# Patient Record
Sex: FEMALE | Race: WHITE | NOT HISPANIC OR LATINO | Employment: FULL TIME | URBAN - NONMETROPOLITAN AREA
[De-identification: names, ages, dates, MRNs, and addresses within clinical notes are randomized per-mention and may not be internally consistent; named-entity substitution may affect disease eponyms.]

---

## 2017-01-20 ENCOUNTER — OFFICE VISIT (OUTPATIENT)
Dept: FAMILY MEDICINE CLINIC | Facility: CLINIC | Age: 42
End: 2017-01-20

## 2017-01-20 VITALS
DIASTOLIC BLOOD PRESSURE: 76 MMHG | WEIGHT: 206 LBS | BODY MASS INDEX: 33.11 KG/M2 | HEIGHT: 66 IN | HEART RATE: 86 BPM | TEMPERATURE: 98.9 F | SYSTOLIC BLOOD PRESSURE: 128 MMHG | OXYGEN SATURATION: 99 %

## 2017-01-20 DIAGNOSIS — K21.9 GASTROESOPHAGEAL REFLUX DISEASE WITHOUT ESOPHAGITIS: ICD-10-CM

## 2017-01-20 DIAGNOSIS — F51.01 PRIMARY INSOMNIA: ICD-10-CM

## 2017-01-20 PROCEDURE — 99213 OFFICE O/P EST LOW 20 MIN: CPT | Performed by: FAMILY MEDICINE

## 2017-01-20 RX ORDER — TRAZODONE HYDROCHLORIDE 50 MG/1
75 TABLET ORAL NIGHTLY
Qty: 135 TABLET | Refills: 3 | Status: SHIPPED | OUTPATIENT
Start: 2017-01-20 | End: 2017-05-05

## 2017-01-20 RX ORDER — TRAZODONE HYDROCHLORIDE 50 MG/1
75 TABLET ORAL NIGHTLY
Qty: 45 TABLET | Refills: 3 | Status: SHIPPED | OUTPATIENT
Start: 2017-01-20 | End: 2017-01-20 | Stop reason: SDUPTHER

## 2017-01-20 NOTE — PROGRESS NOTES
Subjective   Jesica Green is a 42 y.o. female.     Chief Complaint   Patient presents with   • Follow-up     scheduled follow up; Trazodone has not been helping with insomnia       History of Present Illness   Patient is here for scheduled follow-up visit.  The following portions of the patient's history were reviewed and updated as appropriate: allergies, current medications, past family history, past medical history, past social history, past surgical history and problem list.    Review of Systems   Constitutional: Negative for activity change, appetite change, chills, diaphoresis, fatigue, fever and unexpected weight change.   HENT: Negative for congestion, dental problem, drooling, ear discharge, ear pain, facial swelling, hearing loss, mouth sores, nosebleeds, postnasal drip, rhinorrhea, sinus pressure, sneezing, sore throat, tinnitus, trouble swallowing and voice change.    Eyes: Negative for photophobia, pain, discharge, redness, itching and visual disturbance.   Respiratory: Negative for apnea, cough, choking, chest tightness, shortness of breath, wheezing and stridor.    Cardiovascular: Negative for chest pain, palpitations and leg swelling.   Gastrointestinal: Negative for abdominal distention, abdominal pain, anal bleeding, blood in stool, constipation, diarrhea, nausea, rectal pain and vomiting.   Endocrine: Negative for cold intolerance, heat intolerance, polydipsia, polyphagia and polyuria.   Genitourinary: Positive for menstrual problem. Negative for decreased urine volume, difficulty urinating, dysuria, enuresis, flank pain, frequency, genital sores, hematuria and urgency.   Musculoskeletal: Positive for arthralgias, back pain and myalgias. Negative for gait problem, joint swelling, neck pain and neck stiffness.   Skin: Negative for color change, pallor, rash and wound.        Hair loss   Allergic/Immunologic: Negative for food allergies and immunocompromised state.   Neurological: Positive for  headaches. Negative for dizziness, tremors, seizures, syncope, facial asymmetry, speech difficulty, weakness, light-headedness and numbness.   Hematological: Negative for adenopathy. Does not bruise/bleed easily.   Psychiatric/Behavioral: Positive for sleep disturbance. Negative for agitation, behavioral problems, confusion, decreased concentration, dysphoric mood, hallucinations, self-injury and suicidal ideas. The patient is not nervous/anxious and is not hyperactive.         Freq alternating mood, depression and anxiety       Patient Active Problem List   Diagnosis   • Malaise and fatigue   • Polyarthralgia   • Constipation   • Gastroesophageal reflux disease without esophagitis   • Family history of diabetes mellitus   • Family history of thyroid disease in mother   • Arthralgia of hands, bilateral   • Chronic lumbar pain   • Morning joint stiffness of multiple sites   • Telogen effluvium   • Alopecia   • FHx: Charcot-Edita-Tooth disease   • Iron deficiency   • Vitamin D deficiency   • Need for vaccination       Current Outpatient Prescriptions on File Prior to Visit   Medication Sig Dispense Refill   • diclofenac (CATAFLAM) 50 MG tablet Take 1 tablet by mouth 2 (Two) Times a Day With Meals. 60 tablet 5   • ferrous sulfate 325 (65 FE) MG tablet Take 1 tablet by mouth Daily With Breakfast. 60 tablet 5   • omeprazole (PriLOSEC) 40 MG capsule Take 1 capsule by mouth Daily. 30 capsule 5     No current facility-administered medications on file prior to visit.        Social History     Social History   • Marital status:      Spouse name: N/A   • Number of children: N/A   • Years of education: N/A     Occupational History   • Not on file.     Social History Main Topics   • Smoking status: Never Smoker   • Smokeless tobacco: Not on file   • Alcohol use Yes   • Drug use: No   • Sexual activity: Not on file     Other Topics Concern   • Not on file     Social History Narrative       Objective   Blood pressure  "128/76, pulse 86, temperature 98.9 °F (37.2 °C), height 66\" (167.6 cm), weight 206 lb (93.4 kg), SpO2 99 %.     Physical Exam   Constitutional: She is oriented to person, place, and time. She appears well-developed and well-nourished. No distress.   HENT:   Head: Normocephalic and atraumatic.   Right Ear: External ear normal.   Left Ear: External ear normal.   Nose: Nose normal.   Mouth/Throat: Oropharynx is clear and moist. No oropharyngeal exudate.   Eyes: Conjunctivae and EOM are normal. Pupils are equal, round, and reactive to light. Right eye exhibits no discharge. Left eye exhibits no discharge. No scleral icterus.   Neck: Normal range of motion. Neck supple. No JVD present. No tracheal deviation present. No thyromegaly present.   Cardiovascular: Normal rate, normal heart sounds and intact distal pulses.  Exam reveals no gallop and no friction rub.    No murmur heard.  Pulmonary/Chest: Effort normal and breath sounds normal. No stridor. No respiratory distress. She has no wheezes. She has no rales. She exhibits no tenderness.   Abdominal: Soft. Bowel sounds are normal. She exhibits no distension and no mass. There is no tenderness. There is no rebound and no guarding. No hernia.   Genitourinary:   Genitourinary Comments: Pt defers   Musculoskeletal: Normal range of motion. She exhibits no edema, tenderness or deformity.   Lymphadenopathy:     She has no cervical adenopathy.   Neurological: She is alert and oriented to person, place, and time. She has normal reflexes. She displays normal reflexes. No cranial nerve deficit. She exhibits normal muscle tone. Coordination normal.   Skin: Skin is warm. No rash noted. She is not diaphoretic. No erythema. No pallor.   Psychiatric: She has a normal mood and affect. Her behavior is normal. Judgment and thought content normal.   Nursing note and vitals reviewed.      Results for orders placed or performed in visit on 12/09/16   Vitamin D 25 Hydroxy   Result Value Ref " Range    25 Hydroxy, Vitamin D 43.2 ng/ml       Assessment/Plan   Problems Addressed this Visit        Digestive    Gastroesophageal reflux disease without esophagitis      Other Visit Diagnoses     BMI 33.0-33.9,adult    -  Primary    Primary insomnia        Relevant Medications    traZODone (DESYREL) 50 MG tablet               Discussion/Summary:  Discussed plan of care in detail with pt today; pt verb understanding and agrees; counseled for approx 10 min of total 15 min exam time.    Anti - reflux measures, trigger foods and drinks to avoid: Fatty foods, alcohol, chocolate, coffee, tea, caffeinated soft drinks (decaffeinated coffee still has some caffeine), peppermint and spearmint, spices and vinegar, citrus fruits and juices, tomatoes and tomato sauces, and smoking. Other antireflux measures include weight reduction if overweight, avoid tight clothing around the abdomen, elevate the head of her bed 6 inches (May use a bed wedge which is placed between the mattress in box [place]) or blocks under the head of the bed, don't drink or eat for 2 hours before going to bed and avoid lying down immediately after meals.      There are no Patient Instructions on file for this visit.

## 2017-01-20 NOTE — MR AVS SNAPSHOT
Jesica Green   1/20/2017 10:00 AM   Office Visit    Dept Phone:  [phone]   Encounter #:  [id number]    Provider:  Sarthak Patel DO   Department:  [facility] PRIMARY CARE                Your Full Care Plan              Today's Medication Changes          These changes are accurate as of: 1/20/17 10:43 AM.  If you have any questions, ask your nurse or doctor.               Medication(s)that have changed:     traZODone 50 MG tablet   Commonly known as:  DESYREL   Take 1.5 tablets by mouth Every Night.   What changed:  how much to take   Changed by:  Sarthak Patel DO            Where to Get Your Medications      These medications were sent to [facility] - [place], [place] - [address] [place] [address] - [phone]  - [phone] [address] [address]     Phone:  [phone]     traZODone 50 MG tablet                  Your Updated Medication List          This list is accurate as of: 1/20/17 10:43 AM.  Always use your most recent med list.                diclofenac 50 MG tablet   Commonly known as:  CATAFLAM   Take 1 tablet by mouth 2 (Two) Times a Day With Meals.       ferrous sulfate 325 (65 FE) MG tablet   Take 1 tablet by mouth Daily With Breakfast.       omeprazole 40 MG capsule   Commonly known as:  priLOSEC   Take 1 capsule by mouth Daily.       traZODone 50 MG tablet   Commonly known as:  DESYREL   Take 1.5 tablets by mouth Every Night.               You Were Diagnosed With        Codes Comments    BMI 33.0-33.9,adult    -  Primary ICD-10-CM: Z68.33  ICD-9-CM: V85.33     Primary insomnia     ICD-10-CM: F51.01  ICD-9-CM: 307.42     Gastroesophageal reflux disease without esophagitis     ICD-10-CM: K21.9  ICD-9-CM: 530.81       Instructions     None    Patient Instructions History      Upcoming Appointments     Visit Type Date Time Department    OFFICE VISIT 1/20/2017 10:00 AM [facility]    OFFICE VISIT  "2017  9:45 AM [facility]      [employer] Signup     [facility] [employer] allows you to send messages to your doctor, view your test results, renew your prescriptions, schedule appointments, and more. To sign up, go to [employer] and click on the Sign Up Now link in the New User? box. Enter your [employer] Activation Code exactly as it appears below along with the last four digits of your Social Security Number and your Date of Birth () to complete the sign-up process. If you do not sign up before the expiration date, you must request a new code.    [employer] Activation Code: [email or web address]  Expires: 2/3/2017 10:43 AM    If you have questions, you can email [email or web address] or call [phone] to talk to our [employer] staff. Remember, [employer] is NOT to be used for urgent needs. For medical emergencies, dial 911.               Other Info from Your Visit           Your Appointments     2017  9:45 AM EDT   Office Visit with Sarthak Patel DO   [facility]IMARY [facility] (--)    [address] [place] [id number] [phone]           Arrive 15 minutes prior to appointment.              Allergies     No Known Allergies      Reason for Visit     Follow-up scheduled follow up; Trazodone has not been helping with insomnia      Vital Signs     Blood Pressure Pulse Temperature Height Weight Oxygen Saturation    128/76 (BP Location: Right arm, Patient Position: Sitting) 86 98.9 °F (37.2 °C) 66\" (167.6 cm) 206 lb (93.4 kg) 99%    Body Mass Index Smoking Status                33.25 kg/m2 Never Smoker          Problems and Diagnoses Noted     Acid reflux disease    Body mass index 33.0-33.9, adult    -  Primary    Difficulty falling or staying asleep            "

## 2017-05-05 ENCOUNTER — OFFICE VISIT (OUTPATIENT)
Dept: FAMILY MEDICINE CLINIC | Facility: CLINIC | Age: 42
End: 2017-05-05

## 2017-05-05 VITALS
DIASTOLIC BLOOD PRESSURE: 87 MMHG | HEIGHT: 66 IN | RESPIRATION RATE: 16 BRPM | TEMPERATURE: 98.5 F | SYSTOLIC BLOOD PRESSURE: 143 MMHG | OXYGEN SATURATION: 100 % | WEIGHT: 211 LBS | HEART RATE: 63 BPM | BODY MASS INDEX: 33.91 KG/M2

## 2017-05-05 DIAGNOSIS — M25.50 POLYARTHRALGIA: Primary | ICD-10-CM

## 2017-05-05 DIAGNOSIS — R51.9 NOCTURNAL HEADACHES: ICD-10-CM

## 2017-05-05 DIAGNOSIS — G47.09 OTHER INSOMNIA: ICD-10-CM

## 2017-05-05 DIAGNOSIS — R51.9 HEAD AND FACE PAIN: ICD-10-CM

## 2017-05-05 DIAGNOSIS — E61.1 IRON DEFICIENCY: ICD-10-CM

## 2017-05-05 DIAGNOSIS — R53.82 CHRONIC FATIGUE: ICD-10-CM

## 2017-05-05 PROCEDURE — 99214 OFFICE O/P EST MOD 30 MIN: CPT | Performed by: INTERNAL MEDICINE

## 2017-05-06 ENCOUNTER — LAB (OUTPATIENT)
Dept: LAB | Facility: HOSPITAL | Age: 42
End: 2017-05-06
Attending: INTERNAL MEDICINE

## 2017-05-06 DIAGNOSIS — M25.50 POLYARTHRALGIA: ICD-10-CM

## 2017-05-06 DIAGNOSIS — R51.9 NOCTURNAL HEADACHES: ICD-10-CM

## 2017-05-06 LAB
ALBUMIN SERPL-MCNC: 4.3 G/DL (ref 3.5–5)
ALBUMIN/GLOB SERPL: 1.4 G/DL (ref 1–2)
ALP SERPL-CCNC: 54 U/L (ref 38–126)
ALT SERPL W P-5'-P-CCNC: 29 U/L (ref 13–69)
ANION GAP SERPL CALCULATED.3IONS-SCNC: 14.4 MMOL/L
AST SERPL-CCNC: 21 U/L (ref 15–46)
BASOPHILS # BLD AUTO: 0.05 10*3/MM3 (ref 0–0.2)
BASOPHILS NFR BLD AUTO: 0.9 % (ref 0–2.5)
BILIRUB SERPL-MCNC: 0.5 MG/DL (ref 0.2–1.3)
BUN BLD-MCNC: 22 MG/DL (ref 7–20)
BUN/CREAT SERPL: 27.5 (ref 7.1–23.5)
CALCIUM SPEC-SCNC: 9.3 MG/DL (ref 8.4–10.2)
CHLORIDE SERPL-SCNC: 106 MMOL/L (ref 98–107)
CO2 SERPL-SCNC: 25 MMOL/L (ref 26–30)
CREAT BLD-MCNC: 0.8 MG/DL (ref 0.6–1.3)
DEPRECATED RDW RBC AUTO: 40 FL (ref 37–54)
EOSINOPHIL # BLD AUTO: 0.07 10*3/MM3 (ref 0–0.7)
EOSINOPHIL NFR BLD AUTO: 1.3 % (ref 0–7)
ERYTHROCYTE [DISTWIDTH] IN BLOOD BY AUTOMATED COUNT: 12 % (ref 11.5–14.5)
GFR SERPL CREATININE-BSD FRML MDRD: 79 ML/MIN/1.73
GLOBULIN UR ELPH-MCNC: 3.1 GM/DL
GLUCOSE BLD-MCNC: 88 MG/DL (ref 74–98)
HCT VFR BLD AUTO: 39.3 % (ref 37–47)
HGB BLD-MCNC: 13.3 G/DL (ref 12–16)
IMM GRANULOCYTES # BLD: 0.02 10*3/MM3 (ref 0–0.06)
IMM GRANULOCYTES NFR BLD: 0.4 % (ref 0–0.6)
IRON 24H UR-MRATE: 83 MCG/DL (ref 37–181)
IRON SATN MFR SERPL: 29 % (ref 11–46)
LYMPHOCYTES # BLD AUTO: 1.49 10*3/MM3 (ref 0.6–3.4)
LYMPHOCYTES NFR BLD AUTO: 28.2 % (ref 10–50)
MCH RBC QN AUTO: 30.9 PG (ref 27–31)
MCHC RBC AUTO-ENTMCNC: 33.8 G/DL (ref 30–37)
MCV RBC AUTO: 91.4 FL (ref 81–99)
MONOCYTES # BLD AUTO: 0.31 10*3/MM3 (ref 0–0.9)
MONOCYTES NFR BLD AUTO: 5.9 % (ref 0–12)
NEUTROPHILS # BLD AUTO: 3.35 10*3/MM3 (ref 2–6.9)
NEUTROPHILS NFR BLD AUTO: 63.3 % (ref 37–80)
NRBC BLD MANUAL-RTO: 0 /100 WBC (ref 0–0)
PLATELET # BLD AUTO: 210 10*3/MM3 (ref 130–400)
PMV BLD AUTO: 11.1 FL (ref 6–12)
POTASSIUM BLD-SCNC: 4.4 MMOL/L (ref 3.5–5.1)
PROT SERPL-MCNC: 7.4 G/DL (ref 6.3–8.2)
RBC # BLD AUTO: 4.3 10*6/MM3 (ref 4.2–5.4)
SODIUM BLD-SCNC: 141 MMOL/L (ref 137–145)
T4 FREE SERPL-MCNC: 0.94 NG/DL (ref 0.78–2.19)
TIBC SERPL-MCNC: 282 MCG/DL (ref 261–497)
TSH SERPL DL<=0.05 MIU/L-ACNC: 1.56 MIU/ML (ref 0.47–4.68)
WBC NRBC COR # BLD: 5.29 10*3/MM3 (ref 4.8–10.8)

## 2017-05-06 PROCEDURE — 84305 ASSAY OF SOMATOMEDIN: CPT | Performed by: INTERNAL MEDICINE

## 2017-05-06 PROCEDURE — 85025 COMPLETE CBC W/AUTO DIFF WBC: CPT | Performed by: INTERNAL MEDICINE

## 2017-05-06 PROCEDURE — 84481 FREE ASSAY (FT-3): CPT | Performed by: INTERNAL MEDICINE

## 2017-05-06 PROCEDURE — 84439 ASSAY OF FREE THYROXINE: CPT | Performed by: INTERNAL MEDICINE

## 2017-05-06 PROCEDURE — 83550 IRON BINDING TEST: CPT | Performed by: INTERNAL MEDICINE

## 2017-05-06 PROCEDURE — 84443 ASSAY THYROID STIM HORMONE: CPT | Performed by: INTERNAL MEDICINE

## 2017-05-06 PROCEDURE — 83540 ASSAY OF IRON: CPT | Performed by: INTERNAL MEDICINE

## 2017-05-06 PROCEDURE — 80053 COMPREHEN METABOLIC PANEL: CPT | Performed by: INTERNAL MEDICINE

## 2017-05-06 PROCEDURE — 36415 COLL VENOUS BLD VENIPUNCTURE: CPT

## 2017-05-07 LAB — T3FREE SERPL-MCNC: 2.8 PG/ML (ref 2–4.4)

## 2017-05-08 LAB — IGF-I SERPL-MCNC: 100 NG/ML (ref 69–227)

## 2017-05-12 ENCOUNTER — OFFICE VISIT (OUTPATIENT)
Dept: OBSTETRICS AND GYNECOLOGY | Facility: CLINIC | Age: 42
End: 2017-05-12

## 2017-05-12 VITALS
BODY MASS INDEX: 33.27 KG/M2 | WEIGHT: 207 LBS | DIASTOLIC BLOOD PRESSURE: 86 MMHG | HEIGHT: 66 IN | SYSTOLIC BLOOD PRESSURE: 124 MMHG

## 2017-05-12 DIAGNOSIS — N92.6 IRREGULAR MENSES: ICD-10-CM

## 2017-05-12 DIAGNOSIS — Z12.4 SCREENING FOR MALIGNANT NEOPLASM OF CERVIX: ICD-10-CM

## 2017-05-12 DIAGNOSIS — Z01.419 ENCOUNTER FOR GYNECOLOGICAL EXAMINATION WITHOUT ABNORMAL FINDING: Primary | ICD-10-CM

## 2017-05-12 PROCEDURE — 99386 PREV VISIT NEW AGE 40-64: CPT | Performed by: PHYSICIAN ASSISTANT

## 2017-05-19 ENCOUNTER — APPOINTMENT (OUTPATIENT)
Dept: MRI IMAGING | Facility: HOSPITAL | Age: 42
End: 2017-05-19
Attending: INTERNAL MEDICINE

## 2017-06-02 ENCOUNTER — HOSPITAL ENCOUNTER (OUTPATIENT)
Dept: MAMMOGRAPHY | Facility: HOSPITAL | Age: 42
Discharge: HOME | End: 2017-06-02
Admitting: PHYSICIAN ASSISTANT

## 2017-06-02 ENCOUNTER — HOSPITAL ENCOUNTER (OUTPATIENT)
Dept: MRI IMAGING | Facility: HOSPITAL | Age: 42
Discharge: HOME | End: 2017-06-02
Attending: INTERNAL MEDICINE

## 2017-06-02 DIAGNOSIS — R51.9 HEAD AND FACE PAIN: ICD-10-CM

## 2017-06-02 DIAGNOSIS — R51.9 NOCTURNAL HEADACHES: ICD-10-CM

## 2017-06-02 DIAGNOSIS — Z01.419 ENCOUNTER FOR GYNECOLOGICAL EXAMINATION WITHOUT ABNORMAL FINDING: ICD-10-CM

## 2017-06-02 PROCEDURE — A9577 INJ MULTIHANCE: HCPCS | Performed by: INTERNAL MEDICINE

## 2017-06-02 PROCEDURE — 70553 MRI BRAIN STEM W/O & W/DYE: CPT

## 2017-06-02 PROCEDURE — G0202 SCR MAMMO BI INCL CAD: HCPCS

## 2017-06-02 PROCEDURE — 77063 BREAST TOMOSYNTHESIS BI: CPT

## 2017-06-02 PROCEDURE — 0 GADOBENATE DIMEGLUMINE 529 MG/ML SOLUTION: Performed by: INTERNAL MEDICINE

## 2017-06-02 RX ADMIN — GADOBENATE DIMEGLUMINE 15 ML: 529 INJECTION, SOLUTION INTRAVENOUS at 08:45

## 2017-06-02 RX ADMIN — GADOBENATE DIMEGLUMINE 3 ML: 529 INJECTION, SOLUTION INTRAVENOUS at 08:45

## 2017-06-20 ENCOUNTER — OFFICE VISIT (OUTPATIENT)
Dept: OBSTETRICS AND GYNECOLOGY | Facility: CLINIC | Age: 42
End: 2017-06-20

## 2017-06-20 VITALS
WEIGHT: 206 LBS | DIASTOLIC BLOOD PRESSURE: 80 MMHG | HEIGHT: 66 IN | BODY MASS INDEX: 33.11 KG/M2 | SYSTOLIC BLOOD PRESSURE: 132 MMHG

## 2017-06-20 DIAGNOSIS — N92.6 IRREGULAR MENSES: Primary | ICD-10-CM

## 2017-06-20 DIAGNOSIS — N94.6 DYSMENORRHEA: ICD-10-CM

## 2017-06-20 PROCEDURE — 99213 OFFICE O/P EST LOW 20 MIN: CPT | Performed by: PHYSICIAN ASSISTANT

## 2017-06-20 NOTE — PATIENT INSTRUCTIONS
Will rto for fasting labs to check hormone levels-will call with results  Pending review of labs will discuss options for cycle management also

## 2017-06-20 NOTE — PROGRESS NOTES
"Subjective   Chief Complaint   Patient presents with   • Follow-up     tvs done today, pelvic pain     /80  Ht 66\" (167.6 cm)  Wt 206 lb (93.4 kg)  BMI 33.25 kg/m2   Jesica Green is a 42 y.o. year old  presenting to be seen for follow up irregular menses and dysmenorrhea  Her previous visit is reviewed  Also records from [place] reviewed which show patient had hysterogram and not ablation done    TVS done today shows normal uterus with endometrium 16 mm (LMP 17) -ovaries with multiple small follicles bilaterally-no free fluid  Patient has complaint today of difficulty losing weight- acne flare ups and some hair loss  She has had recent labs with pcp and had normal thyroid and CMP   She has been told in past might have PCOS but labs have been normal-testosterone and fasting insulin not checked  Past Medical History:   Diagnosis Date   • Acid reflux    • Anemia    • History of Papanicolaou smear of cervix    • Positive PPD         Current Outpatient Prescriptions:   •  ferrous sulfate 325 (65 FE) MG tablet, Take 1 tablet by mouth once daily with breakfast, Disp: 60 tablet, Rfl: 5  •  Glucosamine-Chondroitin (JOINT PAIN FORMULA PO), Take  by mouth., Disp: , Rfl:   •  omeprazole (PriLOSEC) 40 MG capsule, Take 1 capsule by mouth Daily., Disp: 30 capsule, Rfl: 5  •  diclofenac (VOLTAREN) 50 MG EC tablet, , Disp: , Rfl:    No Known Allergies   Past Surgical History:   Procedure Laterality Date   • BILATERAL BREAST REDUCTION     • FOOT SURGERY     • TONSILLECTOMY        Social History     Social History   • Marital status:      Spouse name: N/A   • Number of children: N/A   • Years of education: N/A     Occupational History   • Not on file.     Social History Main Topics   • Smoking status: Never Smoker   • Smokeless tobacco: Never Used   • Alcohol use Yes   • Drug use: No   • Sexual activity: Defer     Other Topics Concern   • Not on file     Social History Narrative      Family " History   Problem Relation Age of Onset   • Thyroid disease Mother    • Breast cancer Maternal Aunt    • Cervical cancer Maternal Aunt        The following portions of the patient's history were reviewed and updated as appropriate:problem list, current medications, allergies, past family history, past medical history, past social history and past surgical history.    Review of Systems   Constitutional:        Weight gain   Genitourinary: Positive for menstrual problem and vaginal bleeding.        Dysmenorrhea        Objective     Physical Exam  Jesica was seen today for follow-up.    Diagnoses and all orders for this visit:    Irregular menses  -     Insulin, Total; Future  -     Testosterone (Free & Total), LC / MS; Future  -     Estradiol; Future  -     Follicle Stimulating Hormone; Future  -     Progesterone; Future  -     Luteinizing Hormone; Future    Dysmenorrhea             This note was electronically signed.    Kristina Ford PA-C   June 20, 2017

## 2017-06-25 ENCOUNTER — RESULTS ENCOUNTER (OUTPATIENT)
Dept: OBSTETRICS AND GYNECOLOGY | Facility: CLINIC | Age: 42
End: 2017-06-25

## 2017-06-25 DIAGNOSIS — N92.6 IRREGULAR MENSES: ICD-10-CM

## 2017-06-27 LAB
ESTRADIOL SERPL-MCNC: 64.5 PG/ML
FSH SERPL-ACNC: 6.5 MIU/ML
INSULIN SERPL-ACNC: 6.7 UIU/ML (ref 2.6–24.9)
LH SERPL-ACNC: 5.4 MIU/ML
PROGEST SERPL-MCNC: 2.8 NG/ML
TESTOST FREE SERPL-MCNC: 1.8 PG/ML (ref 0–4.2)
TESTOST SERPL-MCNC: 29.3 NG/DL

## 2017-07-14 ENCOUNTER — OFFICE VISIT (OUTPATIENT)
Dept: FAMILY MEDICINE CLINIC | Facility: CLINIC | Age: 42
End: 2017-07-14

## 2017-07-14 VITALS
HEIGHT: 66 IN | DIASTOLIC BLOOD PRESSURE: 89 MMHG | RESPIRATION RATE: 16 BRPM | SYSTOLIC BLOOD PRESSURE: 119 MMHG | WEIGHT: 210 LBS | HEART RATE: 68 BPM | OXYGEN SATURATION: 100 % | TEMPERATURE: 98.6 F | BODY MASS INDEX: 33.75 KG/M2

## 2017-07-14 DIAGNOSIS — R53.82 CHRONIC FATIGUE: ICD-10-CM

## 2017-07-14 DIAGNOSIS — R53.81 MALAISE AND FATIGUE: ICD-10-CM

## 2017-07-14 DIAGNOSIS — R53.83 MALAISE AND FATIGUE: ICD-10-CM

## 2017-07-14 DIAGNOSIS — G47.33 OSA (OBSTRUCTIVE SLEEP APNEA): ICD-10-CM

## 2017-07-14 DIAGNOSIS — M25.561 ACUTE PAIN OF RIGHT KNEE: Primary | ICD-10-CM

## 2017-07-14 PROCEDURE — 99214 OFFICE O/P EST MOD 30 MIN: CPT | Performed by: INTERNAL MEDICINE

## 2017-07-14 NOTE — PROGRESS NOTES
"Subjective   Patient ID: Jesica Green is a 42 y.o. female Pt is here for management of multiple medical problems.    Chief Complaint   Patient presents with   • Fatigue     2 month follow-up   • Insomnia     2 month follow-up   • medication refills     patient will need refills on Ferrous Sulfate if she is to continue taking       History of Present Illness    Sig knee pain with exersice.     No sig wt loss.   Asking to wt loss options.   At 1400 [address].   stopped  The following portions of the patient's history were reviewed and updated as appropriate: allergies, current medications, past family history, past medical history, past social history, past surgical history and problem list.      Review of Systems   Constitutional: Positive for fatigue.   Musculoskeletal: Positive for arthralgias. Negative for back pain.   All other systems reviewed and are negative.      Objective     /89 (BP Location: Left arm, Patient Position: Sitting, Cuff Size: Adult)  Pulse 68  Temp 98.6 °F (37 °C) (Oral)   Resp 16  Ht 66\" (167.6 cm)  Wt 210 lb (95.3 kg)  LMP 06/17/2017  SpO2 100%  BMI 33.89 kg/m2  Physical Exam  General Appearance:    Alert, cooperative, no distress, appears stated age   Head:    Normocephalic, without obvious abnormality, atraumatic   Eyes:    PERRL, conjunctiva/corneas clear, EOM's intact           Ears:    Normal TM's and external ear canals, both ears   Nose:   Nares normal, septum midline, mucosa normal, no drainage    or sinus tenderness   Throat:   Lips, mucosa, and tongue normal; teeth and gums normal   Neck:   Supple, symmetrical, trachea midline, no adenopathy;        thyroid:  No enlargement/tenderness/nodules; no carotid    bruit or JVD   Back:     Symmetric, no curvature, ROM normal, no CVA tenderness   Lungs:     Clear to auscultation bilaterally, respirations unlabored   Chest wall:    No tenderness or deformity   Heart:    Regular rate and rhythm, S1 and S2 normal, no murmur, rub   " or gallop   Abdomen:     Soft, non-tender, bowel sounds active all four quadrants,     no masses, no organomegaly           Extremities:   Sig crepitus in right knee.   Extremities normal, atraumatic, no cyanosis or edema   Pulses:   2+ and symmetric all extremities   Skin:   Skin color, texture, turgor normal, no rashes or lesions   Lymph nodes:   Cervical, supraclavicular, and axillary nodes normal   Neurologic:   CNII-XII intact. Normal strength, sensation and reflexes       throughout       Assessment/Plan     ahi 3.     Lowest o2 sat 85% for 3 min. Pt now able to qualify for any thearpy at this point.  Tired all the time and wakes tired.       Jesica was seen today for fatigue, insomnia and medication refills.    Diagnoses and all orders for this visit:    Acute pain of right knee  -      Knee Orthosis, Elastic With Joints (Hinged Knee Brace)    Malaise and fatigue  -     Comprehensive Metabolic Panel    Chronic fatigue  -     Comprehensive Metabolic Panel    XENIA (obstructive sleep apnea)    Other orders  -     Discontinue: naltrexone-bupropion ER (CONTRAVE) 8-90 MG tablet; Wk 1: 1 tab daily, Wk 2: 1 tab twice a day, Wk 3: 2 tabs in AM, 1 tab in PM, Wk 4: 2 tabs twice a day, Maintenance dose: 2 tabs twice daily.  -     naltrexone-bupropion ER (CONTRAVE) 8-90 MG tablet; Wk 1: 1 tab daily, Wk 2: 1 tab twice a day, Wk 3: 2 tabs in AM, 1 tab in PM, Wk 4: 2 tabs twice a day, Maintenance dose: 2 tabs twice daily.      Return in about 3 months (around 10/14/2017), or if symptoms worsen or fail to improve.                   There are no Patient Instructions on file for this visit.

## 2017-07-27 ENCOUNTER — TELEPHONE (OUTPATIENT)
Dept: FAMILY MEDICINE CLINIC | Facility: CLINIC | Age: 42
End: 2017-07-27

## 2017-07-27 NOTE — TELEPHONE ENCOUNTER
----- Message from Leonarda Motley sent at 7/18/2017  4:59 PM EDT -----  Contact: PATIENT  PATIENT CALLED STATING SHE NEEDS A PA ON HER CONTRAVE PRESCRIPTION.

## 2017-07-27 NOTE — TELEPHONE ENCOUNTER
RX for Contrave has been approved for 7/20/17 to 10/12/17 per [employer], left message for patient to call the office.

## 2017-11-17 ENCOUNTER — OFFICE VISIT (OUTPATIENT)
Dept: FAMILY MEDICINE CLINIC | Facility: CLINIC | Age: 42
End: 2017-11-17

## 2017-11-17 VITALS
RESPIRATION RATE: 16 BRPM | TEMPERATURE: 98.2 F | OXYGEN SATURATION: 100 % | BODY MASS INDEX: 33.11 KG/M2 | WEIGHT: 206 LBS | HEIGHT: 66 IN | DIASTOLIC BLOOD PRESSURE: 85 MMHG | SYSTOLIC BLOOD PRESSURE: 125 MMHG | HEART RATE: 78 BPM

## 2017-11-17 DIAGNOSIS — M19.90 ARTHRITIS: Primary | ICD-10-CM

## 2017-11-17 DIAGNOSIS — R51.9 NONINTRACTABLE HEADACHE, UNSPECIFIED CHRONICITY PATTERN, UNSPECIFIED HEADACHE TYPE: ICD-10-CM

## 2017-11-17 DIAGNOSIS — E61.1 IRON DEFICIENCY: ICD-10-CM

## 2017-11-17 DIAGNOSIS — K21.9 GASTROESOPHAGEAL REFLUX DISEASE WITHOUT ESOPHAGITIS: ICD-10-CM

## 2017-11-17 PROCEDURE — 99214 OFFICE O/P EST MOD 30 MIN: CPT | Performed by: INTERNAL MEDICINE

## 2017-11-17 RX ORDER — OMEPRAZOLE 40 MG/1
40 CAPSULE, DELAYED RELEASE ORAL DAILY
Qty: 90 CAPSULE | Refills: 3 | Status: SHIPPED | OUTPATIENT
Start: 2017-11-17 | End: 2019-06-10 | Stop reason: SDUPTHER

## 2017-11-17 RX ORDER — MELOXICAM 15 MG/1
15 TABLET ORAL DAILY
Qty: 90 TABLET | Refills: 3 | Status: SHIPPED | OUTPATIENT
Start: 2017-11-17 | End: 2019-06-10

## 2017-11-17 NOTE — PROGRESS NOTES
"Subjective     Patient ID: Jesica Green is a 42 y.o. female. Patient is here for management of multiple medical problems.     Chief Complaint   Patient presents with   • Fatigue     follow-up   • Sleep Apnea     follow-up   • Weight Loss     follow-up   • Headache     patient having severe headaches at work after changing jobs   • medication refills     patient needs refills on Diclofenac and Omeprazole sent to [facility]     History of Present Illness   contrave has decreased carving for etoh.  Wt down 10 lb in 3 month.  Sleep has gotten better with job.       The following portions of the patient's history were reviewed and updated as appropriate: allergies, current medications, past family history, past medical history, past social history, past surgical history and problem list.    Review of Systems   Constitutional: Positive for activity change, diaphoresis and fatigue.   Musculoskeletal: Positive for arthralgias and back pain.       Current Outpatient Prescriptions:   •  omeprazole (priLOSEC) 40 MG capsule, Take 1 capsule by mouth Daily., Disp: 90 capsule, Rfl: 3  •  meloxicam (MOBIC) 15 MG tablet, Take 1 tablet by mouth Daily., Disp: 90 tablet, Rfl: 3    Objective      Blood pressure 125/85, pulse 78, temperature 98.2 °F (36.8 °C), temperature source Temporal Artery , resp. rate 16, height 66\" (167.6 cm), weight 206 lb (93.4 kg), SpO2 100 %.    Physical Exam     General Appearance:    Alert, cooperative, no distress, appears stated age   Head:    Normocephalic, without obvious abnormality, atraumatic   Eyes:    PERRL, conjunctiva/corneas clear, EOM's intact   Ears:    Normal TM's and external ear canals, both ears   Nose:   Nares normal, septum midline, mucosa normal, no drainage   or sinus tenderness   Throat:   Lips, mucosa, and tongue normal; teeth and gums normal   Neck:   Supple, symmetrical, trachea midline, no adenopathy;        thyroid:  No enlargement/tenderness/nodules; no carotid    bruit " or JVD   Back:     Symmetric, no curvature, ROM normal, no CVA tenderness   Lungs:     Clear to auscultation bilaterally, respirations unlabored   Chest wall:    No tenderness or deformity   Heart:    Regular rate and rhythm, S1 and S2 normal, no murmur,        rub or gallop   Abdomen:     Soft, non-tender, bowel sounds active all four quadrants,     no masses, no organomegaly   Extremities:   Extremities normal, atraumatic, no cyanosis or edema   Pulses:   2+ and symmetric all extremities   Skin:   Skin color, texture, turgor normal, no rashes or lesions   Lymph nodes:   Cervical, supraclavicular, and axillary nodes normal   Neurologic:   CNII-XII intact. Normal strength, sensation and reflexes       throughout      Results for orders placed or performed in visit on 06/25/17   Insulin, Total   Result Value Ref Range    Insulin 6.7 2.6 - 24.9 uIU/mL   Testosterone (Free & Total), LC / MS   Result Value Ref Range    Testosterone, Total 29.3 ng/dL    Testosterone, Free 1.8 0.0 - 4.2 pg/mL   Estradiol   Result Value Ref Range    Estradiol 64.5 pg/mL   Follicle Stimulating Hormone   Result Value Ref Range    FSH 6.5 mIU/mL   Progesterone   Result Value Ref Range    Progesterone 2.8 ng/mL   Luteinizing Hormone   Result Value Ref Range    LH 5.4 mIU/mL         Assessment/Plan     D/c contrave. No sig benefit.          Jesica was seen today for fatigue, sleep apnea, weight loss, headache and medication refills.    Diagnoses and all orders for this visit:    Arthritis  -     meloxicam (MOBIC) 15 MG tablet; Take 1 tablet by mouth Daily.  -     Iron Profile  -     Vitamin B12  -     Comprehensive Metabolic Panel  -     CBC With Manual Differential  -     Lipid Panel  -     TSH  -     T4, Free    Gastroesophageal reflux disease without esophagitis  -     omeprazole (priLOSEC) 40 MG capsule; Take 1 capsule by mouth Daily.  -     Iron Profile  -     Vitamin B12  -     Comprehensive Metabolic Panel  -     CBC With Manual  Differential  -     Lipid Panel  -     TSH  -     T4, Free    Nonintractable headache, unspecified chronicity pattern, unspecified headache type  -     Iron Profile  -     Vitamin B12  -     Comprehensive Metabolic Panel  -     CBC With Manual Differential  -     Lipid Panel  -     TSH  -     T4, Free    Iron deficiency  -     Iron Profile  -     Vitamin B12  -     Comprehensive Metabolic Panel  -     CBC With Manual Differential  -     Lipid Panel  -     TSH  -     T4, Free      Return in about 6 months (around 5/17/2018).          Patient Instructions   Osteo bi flex. 2 pills a day for arthritis.       Yifan Briones MD    Assessment/Plan

## 2018-06-20 ENCOUNTER — HOSPITAL ENCOUNTER (OUTPATIENT)
Dept: GENERAL RADIOLOGY | Facility: HOSPITAL | Age: 43
Discharge: HOME | End: 2018-06-20
Admitting: INTERNAL MEDICINE

## 2018-06-20 ENCOUNTER — OFFICE VISIT (OUTPATIENT)
Dept: INTERNAL MEDICINE | Facility: CLINIC | Age: 43
End: 2018-06-20

## 2018-06-20 VITALS
SYSTOLIC BLOOD PRESSURE: 120 MMHG | WEIGHT: 181 LBS | TEMPERATURE: 98 F | BODY MASS INDEX: 29.09 KG/M2 | HEIGHT: 66 IN | HEART RATE: 65 BPM | RESPIRATION RATE: 16 BRPM | OXYGEN SATURATION: 100 % | DIASTOLIC BLOOD PRESSURE: 81 MMHG

## 2018-06-20 DIAGNOSIS — M54.50 ACUTE MIDLINE LOW BACK PAIN WITHOUT SCIATICA: ICD-10-CM

## 2018-06-20 DIAGNOSIS — A09 DIARRHEA OF INFECTIOUS ORIGIN: ICD-10-CM

## 2018-06-20 DIAGNOSIS — L98.9 SKIN LESION OF CHEST WALL: ICD-10-CM

## 2018-06-20 DIAGNOSIS — M54.50 ACUTE MIDLINE LOW BACK PAIN WITHOUT SCIATICA: Primary | ICD-10-CM

## 2018-06-20 DIAGNOSIS — Z12.39 SCREENING FOR BREAST CANCER: ICD-10-CM

## 2018-06-20 DIAGNOSIS — E78.00 HYPERCHOLESTEREMIA: ICD-10-CM

## 2018-06-20 DIAGNOSIS — M54.2 NECK PAIN, ACUTE: ICD-10-CM

## 2018-06-20 PROCEDURE — 72110 X-RAY EXAM L-2 SPINE 4/>VWS: CPT

## 2018-06-20 PROCEDURE — 99214 OFFICE O/P EST MOD 30 MIN: CPT | Performed by: INTERNAL MEDICINE

## 2018-06-20 PROCEDURE — 90471 IMMUNIZATION ADMIN: CPT | Performed by: INTERNAL MEDICINE

## 2018-06-20 PROCEDURE — 72050 X-RAY EXAM NECK SPINE 4/5VWS: CPT

## 2018-06-20 PROCEDURE — 90715 TDAP VACCINE 7 YRS/> IM: CPT | Performed by: INTERNAL MEDICINE

## 2018-06-20 NOTE — PROGRESS NOTES
"Subjective     Patient ID: Jesica Green is a 43 y.o. female. Patient is here for management of multiple medical problems.     Chief Complaint   Patient presents with   • accidental fall     patient states she fell down 12 hardwood stairs at her mother's home x 2 weeks ago, patient continuing to have neck and lower back pain, pain in back worse when standing   • Lab orders     patient needs lab orders to check cholesterol     History of Present Illness     Feels 2 weeks ago down 12 stairs in home.   Was going to go to a chiropractor. No er. Went to [place] day after. Slept on hard bed.  Still with neck and lower back pain. Crepitus in neck.    Pt on low card diet.   Now cholest ldl 248.     Wt loss going well.  Pt has decrease red meat.     Bright orange diarrhea started end of [place] trip.    still with diarrhea every am.      The following portions of the patient's history were reviewed and updated as appropriate: allergies, current medications, past family history, past medical history, past social history, past surgical history and problem list.    Review of Systems   Constitutional: Positive for fatigue.   Gastrointestinal: Positive for diarrhea. Negative for abdominal distention and abdominal pain.   Musculoskeletal: Positive for arthralgias, back pain, gait problem, myalgias and neck pain.   All other systems reviewed and are negative.      Current Outpatient Prescriptions:   •  meloxicam (MOBIC) 15 MG tablet, Take 1 tablet by mouth Daily., Disp: 90 tablet, Rfl: 3  •  omeprazole (priLOSEC) 40 MG capsule, Take 1 capsule by mouth Daily., Disp: 90 capsule, Rfl: 3  •  aspirin 81 MG tablet, Take 1 tablet by mouth Daily., Disp: 90 tablet, Rfl: 3    Objective      Blood pressure 120/81, pulse 65, temperature 98 °F (36.7 °C), temperature source Oral, resp. rate 16, height 167.6 cm (66\"), weight 82.1 kg (181 lb), SpO2 100 %.    Physical Exam     General Appearance:    Alert, cooperative, no distress, appears stated " age   Head:    Normocephalic, without obvious abnormality, atraumatic   Eyes:    PERRL, conjunctiva/corneas clear, EOM's intact   Ears:    Normal TM's and external ear canals, both ears   Nose:   Nares normal, septum midline, mucosa normal, no drainage   or sinus tenderness   Throat:   Lips, mucosa, and tongue normal; teeth and gums normal   Neck:   Supple, symmetrical, trachea midline, no adenopathy;        thyroid:  No enlargement/tenderness/nodules; no carotid    bruit or JVD   Back:     Symmetric, no curvature, ROM normal, no CVA tenderness   Lungs:     Clear to auscultation bilaterally, respirations unlabored   Chest wall:    No tenderness or deformity   Heart:    Regular rate and rhythm, S1 and S2 normal, no murmur,        rub or gallop   Abdomen:     Soft, non-tender, bowel sounds active all four quadrants,     no masses, no organomegaly   Extremities:   Extremities normal, atraumatic, no cyanosis or edema   Pulses:   2+ and symmetric all extremities   Skin:   Skin color, texture, turgor normal, no rashes or lesions   Lymph nodes:   Cervical, supraclavicular, and axillary nodes normal   Neurologic:   CNII-XII intact. Normal strength, sensation and reflexes       throughout      Results for orders placed or performed in visit on 06/25/17   Insulin, Total   Result Value Ref Range    Insulin 6.7 2.6 - 24.9 uIU/mL   Testosterone (Free & Total), LC / MS   Result Value Ref Range    Testosterone, Total 29.3 ng/dL    Testosterone, Free 1.8 0.0 - 4.2 pg/mL   Estradiol   Result Value Ref Range    Estradiol 64.5 pg/mL   Follicle Stimulating Hormone   Result Value Ref Range    FSH 6.5 mIU/mL   Progesterone   Result Value Ref Range    Progesterone 2.8 ng/mL   Luteinizing Hormone   Result Value Ref Range    LH 5.4 mIU/mL         Assessment/Plan       Jesica was seen today for accidental fall and lab orders.    Diagnoses and all orders for this visit:    Acute midline low back pain without sciatica  -     XR Spine Lumbar AP  & Lateral With Flex & Ext; Future    Neck pain, acute  -     XR Spine Cervical Complete 4 or 5 View; Future    Skin lesion of chest wall  -     Ambulatory Referral to Dermatology    Hypercholesteremia  -     Lipid panel    Screening for breast cancer  -     Mammo Screening Digital Tomosynthesis Bilateral With CAD    Diarrhea of infectious origin  -     Gastrointestinal Panel, PCR - Stool, Per Rectum    Other orders  -     aspirin 81 MG tablet; Take 1 tablet by mouth Daily.  -     Tdap Vaccine Greater Than or Equal To 6yo IM      Return in about 3 months (around 9/20/2018).          There are no Patient Instructions on file for this visit.     Yifan Briones MD    Assessment/Plan

## 2018-07-27 ENCOUNTER — HOSPITAL ENCOUNTER (OUTPATIENT)
Dept: MAMMOGRAPHY | Facility: HOSPITAL | Age: 43
Discharge: HOME | End: 2018-07-27
Attending: INTERNAL MEDICINE | Admitting: INTERNAL MEDICINE

## 2018-07-27 PROCEDURE — 77067 SCR MAMMO BI INCL CAD: CPT

## 2018-07-27 PROCEDURE — 77063 BREAST TOMOSYNTHESIS BI: CPT

## 2018-11-15 ENCOUNTER — OFFICE VISIT (OUTPATIENT)
Dept: OBSTETRICS AND GYNECOLOGY | Facility: CLINIC | Age: 43
End: 2018-11-15

## 2018-11-15 VITALS
BODY MASS INDEX: 31.27 KG/M2 | HEIGHT: 66 IN | WEIGHT: 194.6 LBS | SYSTOLIC BLOOD PRESSURE: 120 MMHG | DIASTOLIC BLOOD PRESSURE: 78 MMHG

## 2018-11-15 DIAGNOSIS — M79.89 LEFT AXILLARY SWELLING: ICD-10-CM

## 2018-11-15 DIAGNOSIS — N64.4 MASTALGIA: Primary | ICD-10-CM

## 2018-11-15 PROCEDURE — 99214 OFFICE O/P EST MOD 30 MIN: CPT | Performed by: PHYSICIAN ASSISTANT

## 2018-11-15 NOTE — PROGRESS NOTES
Subjective   Chief Complaint   Patient presents with   • Breast Problem     Pain and lump in left breast;  MMG done - WNL       Jesica Green is a 43 y.o. year old  presenting to be seen for complaint of pain and lump in left breast.   A month ago she felt that a place in her left breast felt different and that she had some swelling in the left axilla. She also had some itching on the left breast but this has resolved.  She has not felt a lump per say but just feels swollen in left axilla and also some thickening or a ridge in left breast under nipple.  She had a normal mammogram 2018  She does have a family history of breast cancer in a maternal aunt  She is not on any hormones     Past Medical History:   Diagnosis Date   • Acid reflux    • Anemia    • History of Papanicolaou smear of cervix    • Positive PPD         Current Outpatient Medications:   •  aspirin 81 MG tablet, Take 1 tablet by mouth Daily., Disp: 90 tablet, Rfl: 3  •  meloxicam (MOBIC) 15 MG tablet, Take 1 tablet by mouth Daily., Disp: 90 tablet, Rfl: 3  •  omeprazole (priLOSEC) 40 MG capsule, Take 1 capsule by mouth Daily., Disp: 90 capsule, Rfl: 3   No Known Allergies   Past Surgical History:   Procedure Laterality Date   • BILATERAL BREAST REDUCTION     • FOOT SURGERY     • REDUCTION MAMMAPLASTY     • TONSILLECTOMY        Social History     Socioeconomic History   • Marital status:      Spouse name: Not on file   • Number of children: Not on file   • Years of education: Not on file   • Highest education level: Not on file   Social Needs   • Financial resource strain: Not on file   • Food insecurity - worry: Not on file   • Food insecurity - inability: Not on file   • Transportation needs - medical: Not on file   • Transportation needs - non-medical: Not on file   Occupational History   • Not on file   Tobacco Use   • Smoking status: Never Smoker   • Smokeless tobacco: Never Used   Substance and Sexual Activity   •  "Alcohol use: Yes   • Drug use: No   • Sexual activity: Yes     Birth control/protection: None   Other Topics Concern   • Not on file   Social History Narrative   • Not on file      Family History   Problem Relation Age of Onset   • Thyroid disease Mother    • Hyperlipidemia Mother    • Breast cancer Maternal Aunt    • Cervical cancer Maternal Aunt    • Hyperlipidemia Maternal Grandmother        Review of Systems   Constitutional: Negative.    Gastrointestinal: Negative.    Genitourinary: Negative.            Objective   /78   Ht 167.6 cm (66\")   Wt 88.3 kg (194 lb 9.6 oz)   LMP 11/11/2018 (Exact Date)   Breastfeeding? No   BMI 31.41 kg/m²     Physical Exam   Constitutional: She appears well-developed and well-nourished. She is cooperative.   Pulmonary/Chest: Right breast exhibits no inverted nipple, no mass, no nipple discharge, no skin change and no tenderness. Left breast exhibits no inverted nipple, no mass, no nipple discharge, no skin change and no tenderness.   Left breast with a thickened ridge of tissue palpable under nipple but no well defined lump.  The left axilla is soft and spongy and slightly larger than right axilla but no masses or nodes palpable    Neurological: She is alert.   Skin: Skin is warm and dry.   Psychiatric: She has a normal mood and affect. Her behavior is normal.            Assessment and Plan  Jesica was seen today for breast problem.    Diagnoses and all orders for this visit:    Mastalgia  -     Ambulatory Referral to General Surgery    Left axillary swelling  -     Ambulatory Referral to General Surgery      Patient Instructions   Will refer to general surgery for further evaluation             This note was electronically signed.    Kristina Ford PA-C   November 15, 2018  "

## 2018-12-13 ENCOUNTER — OFFICE VISIT (OUTPATIENT)
Dept: SURGERY | Facility: CLINIC | Age: 43
End: 2018-12-13

## 2018-12-13 VITALS
HEIGHT: 66 IN | WEIGHT: 192.6 LBS | BODY MASS INDEX: 30.95 KG/M2 | OXYGEN SATURATION: 98 % | HEART RATE: 72 BPM | DIASTOLIC BLOOD PRESSURE: 70 MMHG | TEMPERATURE: 98.6 F | SYSTOLIC BLOOD PRESSURE: 120 MMHG

## 2018-12-13 DIAGNOSIS — N64.4 BREAST PAIN, LEFT: ICD-10-CM

## 2018-12-13 DIAGNOSIS — M79.622 LEFT AXILLARY PAIN: Primary | ICD-10-CM

## 2018-12-13 PROCEDURE — 99203 OFFICE O/P NEW LOW 30 MIN: CPT | Performed by: SURGERY

## 2018-12-13 NOTE — PROGRESS NOTES
Subjective   Jesica Green is a 43 y.o. female.   Chief Complaint   Patient presents with   • Breast Pain     Left breast pain and swelling        History of Present Illness     Ms. Green is a 43-year-old female patient referred for evaluation of intermittent left breast pain and swelling for the last 2 months.  She reports having similar symptoms in the past just after the birth of her son, about 9 years ago.  This resolved without intervention, but has recently begun again.  She complains that the pain radiates into the axilla.  She denies palpable masses in the breast.  She denies nipple discharge, skin changes, skin erythema, or new-onset nipple inversion.  She denies any family history of breast cancer.  Her most recent mammogram was done in July 2018 and was BI-RADS 1.  She does have a history of a bilateral reduction mammoplasty in 2002.  She is not currently breast-feeding.    Additionally, the patient complains of a palpable mass on the medial aspect of the left buttock, present for the last 2 years, which she desires to have removed.       The following portions of the patient's history were reviewed and updated as appropriate: allergies, current medications, past family history, past medical history, past social history, past surgical history and problem list.     Patient Active Problem List   Diagnosis   • Malaise and fatigue   • Polyarthralgia   • Constipation   • Gastroesophageal reflux disease without esophagitis   • Family history of diabetes mellitus   • Family history of thyroid disease in mother   • Arthralgia of hands, bilateral   • Chronic lumbar pain   • Morning joint stiffness of multiple sites   • Telogen effluvium   • Alopecia   • FHx: Charcot-Edita-Tooth disease   • Iron deficiency   • Vitamin D deficiency   • Need for vaccination       Past Medical History:   Diagnosis Date   • Acid reflux    • Anemia    • History of Papanicolaou smear of cervix 2015   • Positive PPD        Past Surgical  History:   Procedure Laterality Date   • ACHILLES TENDON REPAIR     • REDUCTION MAMMAPLASTY  2000   • TONSILLECTOMY         Medications:     Current Outpatient Medications:   •  aspirin 81 MG tablet, Take 1 tablet by mouth Daily., Disp: 90 tablet, Rfl: 3  •  meloxicam (MOBIC) 15 MG tablet, Take 1 tablet by mouth Daily., Disp: 90 tablet, Rfl: 3  •  omeprazole (priLOSEC) 40 MG capsule, Take 1 capsule by mouth Daily., Disp: 90 capsule, Rfl: 3    Allergies:   No Known Allergies      Family History   Problem Relation Age of Onset   • Thyroid disease Mother    • Hyperlipidemia Mother    • Breast cancer Maternal Aunt    • Cervical cancer Maternal Aunt    • Hyperlipidemia Maternal Grandmother        Social History     Socioeconomic History   • Marital status:      Spouse name: Not on file   • Number of children: Not on file   • Years of education: Not on file   • Highest education level: Not on file   Tobacco Use   • Smoking status: Never Smoker   • Smokeless tobacco: Never Used   Substance and Sexual Activity   • Alcohol use: Yes     Alcohol/week: 1.2 oz     Types: 2 Glasses of wine per week     Frequency: 2-4 times a month     Drinks per session: 1 or 2     Binge frequency: Never   • Drug use: No   • Sexual activity: Yes     Birth control/protection: None         Review of Systems   Constitutional: Negative for chills, fever and unexpected weight change.   HENT: Negative for hearing loss, trouble swallowing and voice change.    Eyes: Negative for visual disturbance.   Respiratory: Negative for apnea, cough, chest tightness, shortness of breath and wheezing.    Cardiovascular: Negative for chest pain, palpitations and leg swelling.   Gastrointestinal: Negative for abdominal distention, abdominal pain, anal bleeding, blood in stool, constipation, diarrhea, nausea, rectal pain and vomiting.   Endocrine: Negative for cold intolerance and heat intolerance.   Genitourinary: Negative for difficulty urinating, dysuria and  "flank pain.   Musculoskeletal: Negative for back pain and gait problem.   Skin: Negative for color change, rash and wound.   Neurological: Negative for dizziness, syncope, speech difficulty, weakness, light-headedness, numbness and headaches.   Hematological: Negative for adenopathy. Does not bruise/bleed easily.   Psychiatric/Behavioral: Negative for confusion. The patient is not nervous/anxious.        Objective    /70   Pulse 72   Temp 98.6 °F (37 °C) (Temporal)   Ht 167.6 cm (66\")   Wt 87.4 kg (192 lb 9.6 oz)   SpO2 98%   BMI 31.09 kg/m²     Physical Exam   Constitutional: She is oriented to person, place, and time. She appears well-developed and well-nourished.   HENT:   Head: Normocephalic and atraumatic.   Eyes: No scleral icterus.   Neck: Neck supple.   Cardiovascular: Regular rhythm.   Pulmonary/Chest: Effort normal. Right breast exhibits no inverted nipple, no mass, no nipple discharge, no skin change and no tenderness. Left breast exhibits no inverted nipple, no mass, no nipple discharge, no skin change and no tenderness. Breasts are symmetrical. There is no breast swelling.   Scarring noted from bilateral reduction mammoplasty.     Abdominal: Soft. She exhibits no distension. There is no tenderness.   Genitourinary: No breast tenderness, discharge or bleeding.   Neurological: She is alert and oriented to person, place, and time.   Skin: Skin is warm and dry.   The medial aspect of the left buttock demonstrates a somewhat pedunculated soft tissue mass which is about 1.5 cm in diameter.  It is soft and nontender.   Psychiatric: She has a normal mood and affect. Her behavior is normal.         Outside Records:  I have taken the opportunity to review the patient's available outside records in paper format, scanned format and those available on Care Everywhere    Results Review:  I have reviewed the entirety of the patient's clinical lab results.  I have also personally reviewed the relevant " patient imaging.       Assessment/Plan   Jesica was seen today for breast pain.    Diagnoses and all orders for this visit:    Left axillary pain  -     US Axilla Left; Future    Breast pain, left  -     US Breast Left Complete; Future        I reviewed the patient's recent mammography with her in the office today.  I have reassured her that her clinical breast exam is normal.  I did offer her a breast ultrasound for further evaluation, and she is agreeable.  We will arrange this for the hospital and I will have her return once it has been completed.  At that time, we cannot only discuss those results and address any abnormalities that are identified, but we can also make arrangements to remove the buttock lesion.  She is agreeable to this plan of care and his verbalized understanding.

## 2018-12-21 ENCOUNTER — HOSPITAL ENCOUNTER (OUTPATIENT)
Dept: ULTRASOUND IMAGING | Facility: HOSPITAL | Age: 43
Discharge: HOME | End: 2018-12-21
Attending: SURGERY | Admitting: SURGERY

## 2018-12-21 DIAGNOSIS — N64.4 BREAST PAIN, LEFT: ICD-10-CM

## 2018-12-21 PROCEDURE — 76641 ULTRASOUND BREAST COMPLETE: CPT

## 2019-01-02 ENCOUNTER — TELEPHONE (OUTPATIENT)
Dept: SURGERY | Facility: CLINIC | Age: 44
End: 2019-01-02

## 2019-01-02 NOTE — TELEPHONE ENCOUNTER
Patient was notified.     ----- Message from Vita Millan MD sent at 12/21/2018  9:27 AM EST -----  Please let Ms. Green know that her ultrasound was normal.

## 2019-06-10 ENCOUNTER — OFFICE VISIT (OUTPATIENT)
Dept: INTERNAL MEDICINE | Facility: CLINIC | Age: 44
End: 2019-06-10

## 2019-06-10 VITALS
HEART RATE: 73 BPM | WEIGHT: 203 LBS | TEMPERATURE: 98.1 F | DIASTOLIC BLOOD PRESSURE: 90 MMHG | BODY MASS INDEX: 32.77 KG/M2 | OXYGEN SATURATION: 100 % | SYSTOLIC BLOOD PRESSURE: 139 MMHG

## 2019-06-10 DIAGNOSIS — M62.830 LUMBAR PARASPINAL MUSCLE SPASM: ICD-10-CM

## 2019-06-10 DIAGNOSIS — K21.9 GASTROESOPHAGEAL REFLUX DISEASE WITHOUT ESOPHAGITIS: ICD-10-CM

## 2019-06-10 DIAGNOSIS — M25.551 RIGHT HIP PAIN: Primary | ICD-10-CM

## 2019-06-10 PROCEDURE — 99213 OFFICE O/P EST LOW 20 MIN: CPT | Performed by: PHYSICIAN ASSISTANT

## 2019-06-10 RX ORDER — CYCLOBENZAPRINE HCL 5 MG
5 TABLET ORAL 3 TIMES DAILY PRN
Qty: 30 TABLET | Refills: 1 | Status: SHIPPED | OUTPATIENT
Start: 2019-06-10 | End: 2020-08-11

## 2019-06-10 RX ORDER — OMEPRAZOLE 40 MG/1
40 CAPSULE, DELAYED RELEASE ORAL DAILY
Qty: 90 CAPSULE | Refills: 3 | Status: SHIPPED | OUTPATIENT
Start: 2019-06-10 | End: 2020-12-07 | Stop reason: ALTCHOICE

## 2019-06-10 NOTE — PROGRESS NOTES
Subjective     Chief Complaint   Patient presents with   • Back Pain     lower back pain, rt hip       History of Present Illness     Jesica Green is a 44 y.o. female presenting with complaints of lower back pain and right hip pain worsening over the past several days.  No specific injury that she is aware of but does state she worked around the house and outside several days beforehand.  She has been using heat, ice and ibuprofen as needed without much relief.  States she feels okay at rest but with certain movements she feels like something is catching or squeezing.  She denies any radiation of pain down the leg or any numbness, tingling or burning.  Denies any issue with bowel or bladder, saddle anesthesia.    The following portions of the patient's history were reviewed and updated as appropriate: current medications, allergies, PMH.    Review of Systems   Constitutional: Negative for appetite change, chills, fatigue, fever and unexpected weight change.   HENT: Negative for congestion, ear pain, hearing loss, nosebleeds, sinus pressure, sore throat, tinnitus and trouble swallowing.    Eyes: Negative for pain, discharge, redness, itching and visual disturbance.   Respiratory: Negative for cough, chest tightness, shortness of breath and wheezing.    Cardiovascular: Negative for chest pain, palpitations and leg swelling.   Gastrointestinal: Negative for abdominal pain, blood in stool, constipation, diarrhea, nausea and vomiting.   Endocrine: Negative for cold intolerance, heat intolerance, polydipsia, polyphagia and polyuria.   Genitourinary: Negative for decreased urine volume, dysuria, flank pain, frequency and hematuria.   Musculoskeletal: Positive for back pain and myalgias. Negative for arthralgias, gait problem, joint swelling, neck pain and neck stiffness.   Skin: Negative for color change and rash.   Allergic/Immunologic: Negative for environmental allergies, food allergies and immunocompromised state.    Neurological: Negative for dizziness, syncope, weakness, light-headedness and headaches.   Hematological: Negative for adenopathy. Does not bruise/bleed easily.   Psychiatric/Behavioral: Negative for dysphoric mood, sleep disturbance and suicidal ideas. The patient is not nervous/anxious.        Objective     Vitals:    06/10/19 0850   BP: 139/90   Pulse: 73   Temp: 98.1 °F (36.7 °C)   TempSrc: Temporal   SpO2: 100%   Weight: 92.1 kg (203 lb)       Physical Exam   Constitutional: She is oriented to person, place, and time. She appears well-developed and well-nourished.   Cardiovascular: Normal rate and regular rhythm.   Pulmonary/Chest: Effort normal and breath sounds normal.   Abdominal: Soft. Bowel sounds are normal.   Musculoskeletal: Normal range of motion.        Right hip: She exhibits tenderness.        Lumbar back: She exhibits tenderness and spasm.   Lumbar paraspinous muscles ttp   Neurological: She is alert and oriented to person, place, and time.   Skin: Skin is warm and dry.   Psychiatric: She has a normal mood and affect.       Assessment/Plan     Diagnoses and all orders for this visit:    Right hip pain  -     diclofenac (VOLTAREN) 50 MG EC tablet; Take 1 tablet by mouth 2 (Two) Times a Day As Needed (pain).  -     cyclobenzaprine (FLEXERIL) 5 MG tablet; Take 1 tablet by mouth 3 (Three) Times a Day As Needed for Muscle Spasms.    Lumbar paraspinal muscle spasm  -     diclofenac (VOLTAREN) 50 MG EC tablet; Take 1 tablet by mouth 2 (Two) Times a Day As Needed (pain).  -     cyclobenzaprine (FLEXERIL) 5 MG tablet; Take 1 tablet by mouth 3 (Three) Times a Day As Needed for Muscle Spasms.    Heat 2-3 times daily, gentle ROM and stretching. Consider PT if not improving.    Yue Morrow PA-C  06/10/2019         Please note that portions of this note were completed with a voice recognition program. Efforts were made to edit dictation, but occasionally words are mistranscribed.

## 2019-12-05 ENCOUNTER — OFFICE VISIT (OUTPATIENT)
Dept: OBSTETRICS AND GYNECOLOGY | Facility: CLINIC | Age: 44
End: 2019-12-05

## 2019-12-05 VITALS
SYSTOLIC BLOOD PRESSURE: 120 MMHG | DIASTOLIC BLOOD PRESSURE: 82 MMHG | WEIGHT: 200.6 LBS | HEIGHT: 66 IN | BODY MASS INDEX: 32.24 KG/M2

## 2019-12-05 DIAGNOSIS — Z01.419 ENCOUNTER FOR GYNECOLOGICAL EXAMINATION WITHOUT ABNORMAL FINDING: Primary | ICD-10-CM

## 2019-12-05 DIAGNOSIS — Z12.39 SCREENING FOR BREAST CANCER: ICD-10-CM

## 2019-12-05 DIAGNOSIS — Z12.4 SCREENING FOR CERVICAL CANCER: ICD-10-CM

## 2019-12-05 DIAGNOSIS — N93.0 POSTCOITAL BLEEDING: ICD-10-CM

## 2019-12-05 PROCEDURE — 99396 PREV VISIT EST AGE 40-64: CPT | Performed by: PHYSICIAN ASSISTANT

## 2019-12-05 NOTE — PROGRESS NOTES
Subjective   Chief Complaint   Patient presents with   • Gynecologic Exam     Last pap done  11/15/18-WNL, MMG is due; C/O bleeding with intercourse       Jesica Green is a 44 y.o. year old  presenting to be seen for her annual gynecological exam.   She has had occasional post coital bleeding for 2 months but not q time with intercourse  No dyspareunia or pelvic pain. No vaginal discharge or irritation  Her cycles are regular q 28 days and LMP 1 week ago   had vasectomy  screening mammogram is due   Past Medical History:   Diagnosis Date   • Acid reflux    • Anemia    • History of Papanicolaou smear of cervix    • Positive PPD         Current Outpatient Medications:   •  omeprazole (priLOSEC) 40 MG capsule, Take 1 capsule by mouth Daily., Disp: 90 capsule, Rfl: 3  •  aspirin 81 MG tablet, Take 1 tablet by mouth Daily., Disp: 90 tablet, Rfl: 3  •  cyclobenzaprine (FLEXERIL) 5 MG tablet, Take 1 tablet by mouth 3 (Three) Times a Day As Needed for Muscle Spasms., Disp: 30 tablet, Rfl: 1  •  diclofenac (VOLTAREN) 50 MG EC tablet, Take 1 tablet by mouth 2 (Two) Times a Day As Needed (pain)., Disp: 60 tablet, Rfl: 1   No Known Allergies   Past Surgical History:   Procedure Laterality Date   • ACHILLES TENDON REPAIR     • REDUCTION MAMMAPLASTY     • TONSILLECTOMY        Social History     Socioeconomic History   • Marital status:      Spouse name: Not on file   • Number of children: Not on file   • Years of education: Not on file   • Highest education level: Not on file   Tobacco Use   • Smoking status: Never Smoker   • Smokeless tobacco: Never Used   Substance and Sexual Activity   • Alcohol use: Yes     Alcohol/week: 1.2 oz     Types: 2 Glasses of wine per week     Frequency: 2-4 times a month     Drinks per session: 1 or 2     Binge frequency: Never   • Drug use: No   • Sexual activity: Yes     Birth control/protection: None      Family History   Problem Relation Age of Onset   • Thyroid  "disease Mother    • Hyperlipidemia Mother    • Breast cancer Maternal Aunt    • Cervical cancer Maternal Aunt    • Hyperlipidemia Maternal Grandmother        Review of Systems   Constitutional: Negative.    Gastrointestinal: Negative.    Genitourinary: Negative for difficulty urinating, dysuria, menstrual problem, pelvic pain and vaginal discharge.           Objective   /82   Ht 167.6 cm (66\")   Wt 91 kg (200 lb 9.6 oz)   LMP 11/28/2019 (Exact Date)   Breastfeeding? No   BMI 32.38 kg/m²     Physical Exam   Constitutional: She appears well-developed and well-nourished. She is cooperative.   Pulmonary/Chest: Right breast exhibits no inverted nipple, no mass, no nipple discharge, no skin change and no tenderness. Left breast exhibits no inverted nipple, no mass, no nipple discharge, no skin change and no tenderness.   Abdominal: Soft. Normal appearance. There is no tenderness.   Genitourinary: Vagina normal and uterus normal. There is no tenderness or lesion on the right labia. There is no tenderness or lesion on the left labia. Cervix exhibits no motion tenderness and no discharge. Right adnexum displays no mass and no tenderness. Left adnexum displays no mass and no tenderness.   Genitourinary Comments: Pap done   Neurological: She is alert.   Skin: Skin is warm and dry. No lesion and no rash noted.   Psychiatric: She has a normal mood and affect. Her behavior is normal. Thought content normal.            Assessment and Plan  Jesica was seen today for gynecologic exam.    Diagnoses and all orders for this visit:    Encounter for gynecological examination without abnormal finding    Screening for cervical cancer  -     Liquid-based Pap Smear, Screening; Future    Postcoital bleeding    Screening for breast cancer  -     Mammo Screening Digital Tomosynthesis Bilateral With CAD; Future      Patient Instructions   Encourage self breast exam monthly  Annual mammograms  If pap returns normal and postcoital " bleeding continues will have her return for pelvic ultrasound             This note was electronically signed.    Kristina Ford PA-C   December 5, 2019

## 2019-12-05 NOTE — PATIENT INSTRUCTIONS
Encourage self breast exam monthly  Annual mammograms  If pap returns normal and postcoital bleeding continues will have her return for pelvic ultrasound

## 2019-12-12 DIAGNOSIS — Z12.4 SCREENING FOR CERVICAL CANCER: ICD-10-CM

## 2020-01-24 ENCOUNTER — HOSPITAL ENCOUNTER (OUTPATIENT)
Dept: MAMMOGRAPHY | Facility: HOSPITAL | Age: 45
Discharge: HOME | End: 2020-01-24
Admitting: PHYSICIAN ASSISTANT

## 2020-01-24 DIAGNOSIS — Z12.39 SCREENING FOR BREAST CANCER: ICD-10-CM

## 2020-01-24 PROCEDURE — 77067 SCR MAMMO BI INCL CAD: CPT

## 2020-01-24 PROCEDURE — 77063 BREAST TOMOSYNTHESIS BI: CPT

## 2020-08-11 ENCOUNTER — OFFICE VISIT (OUTPATIENT)
Dept: INTERNAL MEDICINE | Facility: CLINIC | Age: 45
End: 2020-08-11

## 2020-08-11 VITALS
WEIGHT: 205 LBS | SYSTOLIC BLOOD PRESSURE: 109 MMHG | OXYGEN SATURATION: 100 % | TEMPERATURE: 97.3 F | BODY MASS INDEX: 32.95 KG/M2 | HEIGHT: 66 IN | DIASTOLIC BLOOD PRESSURE: 73 MMHG | HEART RATE: 68 BPM

## 2020-08-11 DIAGNOSIS — R20.2 TINGLING IN EXTREMITIES: ICD-10-CM

## 2020-08-11 DIAGNOSIS — Z13.228 SCREENING FOR ENDOCRINE, METABOLIC AND IMMUNITY DISORDER: ICD-10-CM

## 2020-08-11 DIAGNOSIS — R53.83 FATIGUE, UNSPECIFIED TYPE: ICD-10-CM

## 2020-08-11 DIAGNOSIS — Z13.0 SCREENING FOR DISORDER OF BLOOD AND BLOOD-FORMING ORGANS: ICD-10-CM

## 2020-08-11 DIAGNOSIS — Z13.29 SCREENING FOR ENDOCRINE, METABOLIC AND IMMUNITY DISORDER: ICD-10-CM

## 2020-08-11 DIAGNOSIS — R22.1 LOCALIZED SWELLING, MASS OR LUMP OF NECK: Primary | ICD-10-CM

## 2020-08-11 DIAGNOSIS — M25.50 CHRONIC PAIN OF MULTIPLE JOINTS: ICD-10-CM

## 2020-08-11 DIAGNOSIS — Z11.59 ENCOUNTER FOR HEPATITIS C SCREENING TEST FOR LOW RISK PATIENT: ICD-10-CM

## 2020-08-11 DIAGNOSIS — R09.89 GLOBUS SYNDROME: ICD-10-CM

## 2020-08-11 DIAGNOSIS — Z13.0 SCREENING FOR ENDOCRINE, METABOLIC AND IMMUNITY DISORDER: ICD-10-CM

## 2020-08-11 DIAGNOSIS — G89.29 CHRONIC PAIN OF MULTIPLE JOINTS: ICD-10-CM

## 2020-08-11 PROCEDURE — 99214 OFFICE O/P EST MOD 30 MIN: CPT | Performed by: NURSE PRACTITIONER

## 2020-08-11 NOTE — PROGRESS NOTES
"Date: 2020    Name: Jesica Green  : 1975    Chief Complaint:   Chief Complaint   Patient presents with   • Abstract     last thursday pt noticed a lump in her throat, pt states that depending on angle of head she felt like the lump would get stuck and she would have to push on it.       HPI:  Jesica Green is a 45 y.o. female presents with swelling noted on left throat/neck 5 days ago.  Feels the lump when she swallows, feels saliva/food \"grate\" across something.  Family members noted left side of throat appear to be swollen.  Stated at one point she was unable to swallow, pushed on the left side of her throat, food was able to pass.  She has also been feeling very tired. Denies abnormal fatigue, weight change, temperature intolerance, hair/skin/nail changes, bowel habit changes, palpitations, anxiety, sore throat, hoarseness, fever, chills, nasal congestion, sinus pressure.   She has been feeling tingling in her extremities, pain in multiple joints.  Pain is been present for several years, she is uncertain how long tingling is been present.  Has a significant family history of rheumatoid arthritis.     History:  The following portions of the patient's history were reviewed and updated as appropriate: allergies, current medications, past medical history, family history, surgical history, social history and problem list.     ROS:  Review of Systems   Constitutional: Negative for activity change and appetite change.   HENT: Negative.    Eyes: Negative.    Respiratory: Negative.    Cardiovascular: Negative.    Gastrointestinal: Negative.    Musculoskeletal: Negative for myalgias.   Skin: Negative for pallor and rash.   Neurological: Negative for dizziness and light-headedness.   Hematological: Does not bruise/bleed easily.       VS:  Vitals:    20 0958   BP: 109/73   Pulse: 68   Temp: 97.3 °F (36.3 °C)   TempSrc: Temporal   SpO2: 100%   Weight: 93 kg (205 lb)   Height: 167.6 cm (66\")     Body " mass index is 33.09 kg/m².    PE:  Physical Exam   Constitutional: She is oriented to person, place, and time. She appears well-developed and well-nourished. No distress.   HENT:   Head: Normocephalic.   Right Ear: External ear normal.   Left Ear: External ear normal.   Eyes: Pupils are equal, round, and reactive to light. Conjunctivae are normal.   Neck: Normal range of motion. Neck supple. No thyromegaly present.   Soft, nontender swelling left throat.   Cardiovascular: Normal rate, regular rhythm, normal heart sounds and intact distal pulses.   Pulmonary/Chest: Effort normal and breath sounds normal.   Musculoskeletal: Normal range of motion. She exhibits no edema, tenderness or deformity.   Lymphadenopathy:     She has no cervical adenopathy.   Neurological: She is alert and oriented to person, place, and time.   Skin: Skin is warm. Capillary refill takes less than 2 seconds.   Psychiatric: She has a normal mood and affect. Her behavior is normal.       Assessment/Plan:  Jesica was seen today for abstract.    Diagnoses and all orders for this visit:    Localized swelling, mass or lump of neck  -     US Head Neck Soft Tissue    Globus syndrome  -     T4, Free  -     TSH    Chronic pain of multiple joints  -     Rheumatoid Arthritis Expanded Panel    Fatigue, unspecified type  -     CBC & Differential  -     T4, Free  -     TSH  -     T3, Free  -     EBV Antibody Profile    Tingling in extremities  -     Vitamin B12  -     Vitamin D 25 Hydroxy    Screening for disorder of blood and blood-forming organs  -     CBC & Differential    Screening for endocrine, metabolic and immunity disorder  -     Comprehensive Metabolic Panel    Encounter for hepatitis C screening test for low risk patient  -     Hepatitis C Antibody          Return in about 3 months (around 11/11/2020) for Annual.

## 2020-08-13 LAB
25(OH)D3+25(OH)D2 SERPL-MCNC: 54.6 NG/ML (ref 30–100)
ALBUMIN SERPL-MCNC: 4.5 G/DL (ref 3.8–4.8)
ALBUMIN/GLOB SERPL: 2 {RATIO} (ref 1.2–2.2)
ALP SERPL-CCNC: 66 IU/L (ref 39–117)
ALT SERPL-CCNC: 11 IU/L (ref 0–32)
AST SERPL-CCNC: 15 IU/L (ref 0–40)
BASOPHILS # BLD AUTO: 0.1 X10E3/UL (ref 0–0.2)
BASOPHILS NFR BLD AUTO: 1 %
BILIRUB SERPL-MCNC: 0.5 MG/DL (ref 0–1.2)
BUN SERPL-MCNC: 13 MG/DL (ref 6–24)
BUN/CREAT SERPL: 15 (ref 9–23)
CALCIUM SERPL-MCNC: 9.4 MG/DL (ref 8.7–10.2)
CCP IGA+IGG SERPL IA-ACNC: 5 UNITS (ref 0–19)
CHLORIDE SERPL-SCNC: 101 MMOL/L (ref 96–106)
CO2 SERPL-SCNC: 24 MMOL/L (ref 20–29)
CREAT SERPL-MCNC: 0.86 MG/DL (ref 0.57–1)
CRP SERPL-MCNC: 1 MG/L (ref 0–10)
EBV NA IGG SER IA-ACNC: 253 U/ML (ref 0–17.9)
EBV VCA IGG SER IA-ACNC: >600 U/ML (ref 0–17.9)
EBV VCA IGM SER IA-ACNC: <36 U/ML (ref 0–35.9)
EOSINOPHIL # BLD AUTO: 0 X10E3/UL (ref 0–0.4)
EOSINOPHIL NFR BLD AUTO: 1 %
ERYTHROCYTE [DISTWIDTH] IN BLOOD BY AUTOMATED COUNT: 11.5 % (ref 11.7–15.4)
ERYTHROCYTE [SEDIMENTATION RATE] IN BLOOD BY WESTERGREN METHOD: 2 MM/HR (ref 0–32)
GLOBULIN SER CALC-MCNC: 2.3 G/DL (ref 1.5–4.5)
GLUCOSE SERPL-MCNC: 85 MG/DL (ref 65–99)
HCT VFR BLD AUTO: 40.6 % (ref 34–46.6)
HCV AB S/CO SERPL IA: <0.1 S/CO RATIO (ref 0–0.9)
HGB BLD-MCNC: 13.4 G/DL (ref 11.1–15.9)
IMM GRANULOCYTES # BLD AUTO: 0 X10E3/UL (ref 0–0.1)
IMM GRANULOCYTES NFR BLD AUTO: 1 %
LYMPHOCYTES # BLD AUTO: 1.5 X10E3/UL (ref 0.7–3.1)
LYMPHOCYTES NFR BLD AUTO: 29 %
MCH RBC QN AUTO: 30.9 PG (ref 26.6–33)
MCHC RBC AUTO-ENTMCNC: 33 G/DL (ref 31.5–35.7)
MCV RBC AUTO: 94 FL (ref 79–97)
MONOCYTES # BLD AUTO: 0.3 X10E3/UL (ref 0.1–0.9)
MONOCYTES NFR BLD AUTO: 6 %
NEUTROPHILS # BLD AUTO: 3.4 X10E3/UL (ref 1.4–7)
NEUTROPHILS NFR BLD AUTO: 62 %
PLATELET # BLD AUTO: 230 X10E3/UL (ref 150–450)
POTASSIUM SERPL-SCNC: 4.5 MMOL/L (ref 3.5–5.2)
PROT SERPL-MCNC: 6.8 G/DL (ref 6–8.5)
RBC # BLD AUTO: 4.33 X10E6/UL (ref 3.77–5.28)
RHEUMATOID FACT SERPL-ACNC: 13.7 IU/ML (ref 0–13.9)
SERVICE CMNT-IMP: ABNORMAL
SODIUM SERPL-SCNC: 140 MMOL/L (ref 134–144)
T3FREE SERPL-MCNC: 2.5 PG/ML (ref 2–4.4)
T4 FREE SERPL-MCNC: 1.06 NG/DL (ref 0.82–1.77)
TSH SERPL DL<=0.005 MIU/L-ACNC: 1.57 UIU/ML (ref 0.45–4.5)
VIT B12 SERPL-MCNC: 561 PG/ML (ref 232–1245)
WBC # BLD AUTO: 5.4 X10E3/UL (ref 3.4–10.8)

## 2020-08-21 ENCOUNTER — HOSPITAL ENCOUNTER (OUTPATIENT)
Dept: ULTRASOUND IMAGING | Facility: HOSPITAL | Age: 45
Discharge: HOME | End: 2020-08-21
Admitting: NURSE PRACTITIONER

## 2020-08-21 PROCEDURE — 76536 US EXAM OF HEAD AND NECK: CPT

## 2020-12-07 ENCOUNTER — OFFICE VISIT (OUTPATIENT)
Dept: FAMILY MEDICINE CLINIC | Facility: CLINIC | Age: 45
End: 2020-12-07

## 2020-12-07 ENCOUNTER — RESULTS ENCOUNTER (OUTPATIENT)
Dept: FAMILY MEDICINE CLINIC | Facility: CLINIC | Age: 45
End: 2020-12-07

## 2020-12-07 VITALS
HEIGHT: 66 IN | TEMPERATURE: 97.5 F | DIASTOLIC BLOOD PRESSURE: 88 MMHG | HEART RATE: 57 BPM | OXYGEN SATURATION: 99 % | WEIGHT: 205 LBS | BODY MASS INDEX: 32.95 KG/M2 | SYSTOLIC BLOOD PRESSURE: 138 MMHG

## 2020-12-07 DIAGNOSIS — M25.60 MORNING STIFFNESS OF JOINTS: ICD-10-CM

## 2020-12-07 DIAGNOSIS — K21.9 GASTROESOPHAGEAL REFLUX DISEASE WITHOUT ESOPHAGITIS: ICD-10-CM

## 2020-12-07 DIAGNOSIS — F51.01 PRIMARY INSOMNIA: ICD-10-CM

## 2020-12-07 DIAGNOSIS — M25.50 POLYARTHRALGIA: ICD-10-CM

## 2020-12-07 DIAGNOSIS — Z83.2 FAMILY HISTORY OF AUTOIMMUNE DISORDER: ICD-10-CM

## 2020-12-07 DIAGNOSIS — M25.60 MORNING STIFFNESS OF JOINTS: Primary | ICD-10-CM

## 2020-12-07 DIAGNOSIS — E61.1 IRON DEFICIENCY: ICD-10-CM

## 2020-12-07 DIAGNOSIS — M79.10 MYALGIA: ICD-10-CM

## 2020-12-07 PROCEDURE — 99214 OFFICE O/P EST MOD 30 MIN: CPT | Performed by: FAMILY MEDICINE

## 2020-12-07 RX ORDER — PANTOPRAZOLE SODIUM 40 MG/1
40 TABLET, DELAYED RELEASE ORAL DAILY
Qty: 90 TABLET | Refills: 1 | Status: SHIPPED | OUTPATIENT
Start: 2020-12-07 | End: 2020-12-22 | Stop reason: ALTCHOICE

## 2020-12-07 RX ORDER — TEMAZEPAM 15 MG/1
15 CAPSULE ORAL NIGHTLY PRN
Qty: 30 CAPSULE | Refills: 0 | Status: SHIPPED | OUTPATIENT
Start: 2020-12-07 | End: 2021-01-05

## 2020-12-07 NOTE — PROGRESS NOTES
Subjective   Jesica Green is a 45 y.o. female.     History of Present Illness The patient is here to establish care. This is also the time of her annual physical. Today she is complaining of insomnia. She reports that she has had insomnia for several years. She has trouble both falling asleep and staying asleep. She has a history of night terrors and sleep paralysis. She report an average of three to four hours of sleep a night.   She is also having joint pain. She has a family history of RA. She has had recent work up for RA which is negative. The patient does report joint swelling in her hands, and pain in feet, hip, hands, and hard nodules on gums. She has had the joint pain for 30 years or more.     The following portions of the patient's history were reviewed and updated as appropriate: allergies, current medications, past family history, past medical history, past social history, past surgical history and problem list.    Review of Systems   Constitutional: Positive for fatigue. Negative for activity change, appetite change, fever, unexpected weight gain and unexpected weight loss.   HENT: Positive for facial swelling (eyes swell frequently), mouth sores (hard nodules on gums), postnasal drip, sinus pressure and trouble swallowing (must turn head to the right ). Negative for congestion, drooling, ear discharge, sore throat and voice change.    Eyes: Positive for photophobia. Negative for blurred vision and double vision.   Respiratory: Negative.  Negative for cough, choking, chest tightness, shortness of breath and wheezing.    Cardiovascular: Negative.  Negative for chest pain, palpitations and leg swelling.   Gastrointestinal: Negative.  Positive for GERD and indigestion. Negative for diarrhea, nausea and vomiting.   Endocrine: Positive for cold intolerance.   Genitourinary: Negative.  Negative for dysuria, frequency, hematuria and urgency.   Musculoskeletal: Positive for arthralgias, back pain, joint  swelling, neck pain and neck stiffness. Negative for gait problem.   Skin: Negative for color change, dry skin and rash.   Allergic/Immunologic: Negative for environmental allergies, food allergies and immunocompromised state.   Neurological: Positive for dizziness and light-headedness (when stands up to fast). Negative for seizures and syncope.   Hematological: Positive for adenopathy (has had frequent utrasound of lymph nodes). Bruises/bleeds easily.   Psychiatric/Behavioral: Positive for sleep disturbance. Negative for self-injury, suicidal ideas and depressed mood. The patient is not nervous/anxious.         Insomnia        Objective   Physical Exam  Constitutional:       General: She is not in acute distress.     Appearance: Normal appearance. She is normal weight. She is not ill-appearing, toxic-appearing or diaphoretic.   HENT:      Head: Normocephalic and atraumatic.      Right Ear: External ear normal.      Left Ear: External ear normal.      Nose: Nose normal. No congestion or rhinorrhea.      Mouth/Throat:      Mouth: Mucous membranes are moist.      Pharynx: Oropharynx is clear.      Comments: Nodules noted to right and left upper jaw. Skin intact no erythema.   Eyes:      General: No scleral icterus.        Right eye: No discharge.         Left eye: No discharge.      Extraocular Movements: Extraocular movements intact.      Conjunctiva/sclera: Conjunctivae normal.      Pupils: Pupils are equal, round, and reactive to light.   Neck:      Musculoskeletal: Normal range of motion and neck supple. No neck rigidity or muscular tenderness.      Vascular: No carotid bruit.   Cardiovascular:      Rate and Rhythm: Normal rate and regular rhythm.      Pulses: Normal pulses.      Heart sounds: Normal heart sounds. No murmur. No friction rub. No gallop.    Pulmonary:      Effort: Pulmonary effort is normal. No respiratory distress.      Breath sounds: Normal breath sounds. No stridor. No wheezing, rhonchi or rales.    Chest:      Chest wall: No tenderness.   Abdominal:      General: Abdomen is flat. Bowel sounds are normal. There is no distension.      Palpations: Abdomen is soft.      Tenderness: There is no abdominal tenderness. There is no guarding.   Musculoskeletal: Normal range of motion.         General: No swelling, tenderness, deformity or signs of injury.      Right lower leg: No edema.      Left lower leg: No edema.   Lymphadenopathy:      Cervical: No cervical adenopathy.   Skin:     General: Skin is warm and dry.      Capillary Refill: Capillary refill takes less than 2 seconds.      Coloration: Skin is not jaundiced or pale.      Findings: No bruising, erythema, lesion or rash.   Neurological:      General: No focal deficit present.      Mental Status: She is alert and oriented to person, place, and time.      Cranial Nerves: No cranial nerve deficit.      Sensory: No sensory deficit.      Motor: No weakness.      Coordination: Coordination normal.      Gait: Gait normal.      Deep Tendon Reflexes: Reflexes normal.   Psychiatric:         Mood and Affect: Mood normal.         Behavior: Behavior normal.         Thought Content: Thought content normal.         Judgment: Judgment normal.           Assessment/Plan   {[facility]ess/PlanSmarEast [facility]:[id number]}

## 2020-12-08 LAB
ALBUMIN SERPL-MCNC: 4.8 G/DL (ref 3.5–5.2)
ALBUMIN/GLOB SERPL: 2.5 G/DL
ALP SERPL-CCNC: 69 U/L (ref 39–117)
ALT SERPL-CCNC: 17 U/L (ref 1–33)
ANA SER QL: NEGATIVE
AST SERPL-CCNC: 16 U/L (ref 1–32)
BILIRUB SERPL-MCNC: 0.2 MG/DL (ref 0–1.2)
BUN SERPL-MCNC: 16 MG/DL (ref 6–20)
BUN/CREAT SERPL: 19.8 (ref 7–25)
CALCIUM SERPL-MCNC: 9.5 MG/DL (ref 8.6–10.5)
CHLORIDE SERPL-SCNC: 101 MMOL/L (ref 98–107)
CO2 SERPL-SCNC: 26.8 MMOL/L (ref 22–29)
CREAT SERPL-MCNC: 0.81 MG/DL (ref 0.57–1)
CRP SERPL-MCNC: 0.12 MG/DL (ref 0–0.5)
FERRITIN SERPL-MCNC: 86.7 NG/ML (ref 13–150)
GLOBULIN SER CALC-MCNC: 1.9 GM/DL
GLUCOSE SERPL-MCNC: 93 MG/DL (ref 65–99)
IRON SATN MFR SERPL: 25 % (ref 20–50)
IRON SERPL-MCNC: 87 MCG/DL (ref 37–145)
POTASSIUM SERPL-SCNC: 4.4 MMOL/L (ref 3.5–5.2)
PROT SERPL-MCNC: 6.7 G/DL (ref 6–8.5)
SODIUM SERPL-SCNC: 138 MMOL/L (ref 136–145)
T4 FREE SERPL-MCNC: 1.22 NG/DL (ref 0.93–1.7)
TIBC SERPL-MCNC: 352 MCG/DL
TSH SERPL DL<=0.005 MIU/L-ACNC: 1.63 UIU/ML (ref 0.27–4.2)
UIBC SERPL-MCNC: 265 MCG/DL (ref 112–346)
URATE SERPL-MCNC: 3.8 MG/DL (ref 2.4–5.7)

## 2020-12-22 ENCOUNTER — OFFICE VISIT (OUTPATIENT)
Dept: FAMILY MEDICINE CLINIC | Facility: CLINIC | Age: 45
End: 2020-12-22

## 2020-12-22 VITALS
DIASTOLIC BLOOD PRESSURE: 80 MMHG | HEART RATE: 62 BPM | BODY MASS INDEX: 33.43 KG/M2 | HEIGHT: 66 IN | WEIGHT: 208 LBS | SYSTOLIC BLOOD PRESSURE: 110 MMHG | OXYGEN SATURATION: 99 % | TEMPERATURE: 97.5 F

## 2020-12-22 DIAGNOSIS — G25.81 RESTLESS LEGS SYNDROME: ICD-10-CM

## 2020-12-22 DIAGNOSIS — E04.1 THYROID NODULE: ICD-10-CM

## 2020-12-22 DIAGNOSIS — K21.9 GASTROESOPHAGEAL REFLUX DISEASE WITHOUT ESOPHAGITIS: ICD-10-CM

## 2020-12-22 DIAGNOSIS — R30.0 DYSURIA: ICD-10-CM

## 2020-12-22 DIAGNOSIS — M25.50 POLYARTHRALGIA: Primary | ICD-10-CM

## 2020-12-22 DIAGNOSIS — R13.19 ESOPHAGEAL DYSPHAGIA: ICD-10-CM

## 2020-12-22 DIAGNOSIS — G47.01 INSOMNIA DUE TO MEDICAL CONDITION: ICD-10-CM

## 2020-12-22 PROCEDURE — 99214 OFFICE O/P EST MOD 30 MIN: CPT | Performed by: FAMILY MEDICINE

## 2020-12-22 RX ORDER — ROPINIROLE 0.25 MG/1
TABLET, FILM COATED ORAL
Qty: 60 TABLET | Refills: 1 | Status: SHIPPED | OUTPATIENT
Start: 2020-12-22 | End: 2021-01-19 | Stop reason: ALTCHOICE

## 2020-12-22 RX ORDER — OMEPRAZOLE 40 MG/1
40 CAPSULE, DELAYED RELEASE ORAL DAILY
Qty: 90 CAPSULE | Refills: 1 | Status: SHIPPED | OUTPATIENT
Start: 2020-12-22 | End: 2021-03-02 | Stop reason: SDUPTHER

## 2020-12-22 NOTE — PROGRESS NOTES
Established Patient        Chief Complaint:   Chief Complaint   Patient presents with   • Follow-up     GERD; patient would like to DC protonix and restart Omeprazole; f/u insomnia        Jesica Green is a 45 y.o. female    History of Present Illness:   Here today for scheduled follow-up visit concerning her GERD, polyarthralgia and insomnia.  Patient states she had worsened reflux with transition to pantoprazole.  She would like to be changed back to omeprazole as this did work better for her.  She gives a rather lengthy history of esophageal dysphagia to solids.  She denies any aspiration.  No reports of any bright red blood or black or tarry stools.  Patient reports significant restlessness with her sleep at night additionally.  Her  reports periodic movement of her lower extremities of a significant nature.  Patient states she frequently awakens additionally.  Often times feels malaise/fatigue in the morning, as well as excessive daytime drowsiness.  She has had significant difficulties with weight gain despite significant and frequent changes to her dietary intake and activity level consistency.  Subjective     The following portions of the patient's history were reviewed and updated as appropriate: allergies, current medications, past family history, past medical history, past social history, past surgical history and problem list.    Allergies   Allergen Reactions   • Ppd [Tuberculin Purified Protein Derivative] Rash       Review of Systems  1. Constitutional: Negative for fever. Negative for chills, diaphoresis.  Malaise/fatigue as per above, and unexpected weight change.   2. HENT: No dysphagia; no changes to vision/hearing/smell/taste; no epistaxis  3. Eyes: Negative for redness and visual disturbance.   4. Respiratory: negative for shortness of breath. Negative for chest pain . Negative for cough and chest tightness.   5. Cardiovascular: Negative for chest pain and palpitations.  "  6. Gastrointestinal: Negative for abdominal distention, abdominal pain and blood in stool.   7. Endocrine: Negative for cold intolerance and heat intolerance.   8. Genitourinary: Negative for difficulty urinating, dysuria and frequency.   9. Musculoskeletal: Negative for arthralgias, back pain and myalgias.   10. Skin: Negative for color change, rash and wound.   11. Neurological: Negative for syncope, weakness and headaches.   12. Hematological: Negative for adenopathy. Does not bruise/bleed easily.   13. Psychiatric/Behavioral: Negative for confusion. The patient is not nervous/anxious.    Objective     Physical Exam   Vital Signs: /80   Pulse 62   Temp 97.5 °F (36.4 °C)   Ht 167.6 cm (66\")   Wt 94.3 kg (208 lb)   SpO2 99%   BMI 33.57 kg/m²     General Appearance: alert, oriented x 3, no acute distress.  Skin: warm and dry.   HEENT: Atraumatic.  pupils round and reactive to light and accommodation, oral mucosa pink and moist.  Nares patent without epistaxis.  External auditory canals are patent tympanic membranes intact.  Neck: supple, no JVD, trachea midline.  No thyromegaly  Lungs: CTA, unlabored breathing effort.  Heart: RRR, normal S1 and S2, no S3, no rub.  Abdomen: soft, non-tender, no palpable bladder, present bowel sounds to auscultation ×4.  No guarding or rigidity.  Extremities: no clubbing, cyanosis or edema.  Good range of motion actively and passively.  Symmetric muscle strength and development  Neuro: normal speech and mental status.  Cranial nerves II through XII intact.  No anosmia. DTR 2+; proprioception intact.  No focal motor/sensory deficits.    Advance Care Planning   ACP discussion was held with the patient during this visit. Patient does not have an advance directive, information provided.      Assessment and Plan      Assessment:   Diagnoses and all orders for this visit:    1. Polyarthralgia (Primary)    2. Gastroesophageal reflux disease without esophagitis  -     omeprazole " (priLOSEC) 40 MG capsule; Take 1 capsule by mouth Daily.  Dispense: 90 capsule; Refill: 1  -     Ambulatory Referral to Gastroenterology    3. Esophageal dysphagia  -     Ambulatory Referral to Gastroenterology    4. Restless legs syndrome  -     rOPINIRole (REQUIP) 0.25 MG tablet; 0.5 tab po qhs x 2 nights; then 1 tab po qhs x next 5 nights; then inc to 2 tabs po qhs each night thereafter  Dispense: 60 tablet; Refill: 1    5. Insomnia due to medical condition    6. Thyroid nodule  -     Ambulatory Referral to General Surgery    7. Dysuria  -     Urinalysis With Culture If Indicated - Urine, Clean Catch        Plan:  Discontinue Protonix.  Restart omeprazole.  Plan to follow clinically.  Continue dietary/lifestyle modifications to aid in symptom control of her reflux.  I have placed a referral to see gastroenterology concerning her esophageal dysphagia, she will most certainly need upper endoscopy.  I do suspect longstanding insidious reflux has contributed to her esophageal abnormality, as well as continued reflux symptoms despite PPI therapy and dietary/lifestyle modifications.  High probability of hiatal/paraesophageal hernia additionally.    I have added ropinirole to her treatment regimen.  Plan to follow clinically.  I am hopeful that treatment of her restless leg syndrome will aid in her otherwise problematic insomnia.  Uncontrolled reflux may also be contributing to her frequent wakefulness.    I have added a urinalysis with culture, patient has a longstanding history of recurrent urinary tract infections, demonstrates mild symptoms even today.    Ultrasound report reviewed from last summer, did demonstrate a left thyroid nodule greater than 1 cm.  Patient has never undergone fine-needle aspiration, they recommended repeating ultrasound in approximately 6 months.  Referral to surgery for ultrasound-guided FNA.  Discussion Summary:    Discussed plan of care in detail with pt today; pt verb understanding and  agrees.  Follow up:  Return in about 4 weeks (around 1/19/2021) for Recheck, Med Change/New Meds.     There are no Patient Instructions on file for this visit.    Sarthak Patel, DO  12/23/20  08:08 EST          Please note that portions of this note may have been completed with a voice recognition program. Efforts were made to edit the dictations, but occasionally words are mistranscribed.

## 2020-12-23 LAB
APPEARANCE UR: CLEAR
BACTERIA #/AREA URNS HPF: NORMAL /HPF
BILIRUB UR QL STRIP: NEGATIVE
COLOR UR: YELLOW
EPI CELLS #/AREA URNS HPF: NORMAL /HPF (ref 0–10)
GLUCOSE UR QL: NEGATIVE
HGB UR QL STRIP: ABNORMAL
KETONES UR QL STRIP: NEGATIVE
LEUKOCYTE ESTERASE UR QL STRIP: NEGATIVE
MICRO URNS: ABNORMAL
MUCOUS THREADS URNS QL MICRO: PRESENT /HPF
NITRITE UR QL STRIP: NEGATIVE
PH UR STRIP: 6.5 [PH] (ref 5–7.5)
PROT UR QL STRIP: NEGATIVE
RBC #/AREA URNS HPF: NORMAL /HPF (ref 0–2)
SP GR UR: 1.01 (ref 1–1.03)
URINALYSIS REFLEX: ABNORMAL
UROBILINOGEN UR STRIP-MCNC: 0.2 MG/DL (ref 0.2–1)
WBC #/AREA URNS HPF: NORMAL /HPF (ref 0–5)

## 2020-12-30 NOTE — PROGRESS NOTES
Established Patient        Chief Complaint:   Chief Complaint   Patient presents with   • Establish Care     re-establish care; former patient of Dr Briones; patient c/o difficulty falling asleep and staying asleep x several years; joint pain        Jesica Green is a 45 y.o. female    History of Present Illness:   The patient is here to establish care. This is also the time of her annual physical. Today she is complaining of insomnia. She reports that she has had insomnia for several years. She has trouble both falling asleep and staying asleep. She has a history of night terrors and sleep paralysis. She report an average of three to four hours of sleep a night.   She is also having joint pain. She has a family history of RA. She has had recent work up for RA which is negative. The patient does report joint swelling in her hands, and pain in feet, hip, hands, and hard nodules on gums. She has had the joint pain for 30 years or more.  She describes morning stiffness lasting greater than 1 hour daily.    Subjective     The following portions of the patient's history were reviewed and updated as appropriate: allergies, current medications, past family history, past medical history, past social history, past surgical history and problem list.    Allergies   Allergen Reactions   • Ppd [Tuberculin Purified Protein Derivative] Rash       Review of Systems  1. Constitutional: Negative for fever. Negative for chills, diaphoresis, fatigue and unexpected weight change.   2. HENT: No dysphagia; no changes to vision/hearing/smell/taste; no epistaxis  3. Eyes: Negative for redness and visual disturbance.   4. Respiratory: negative for shortness of breath. Negative for chest pain . Negative for cough and chest tightness.   5. Cardiovascular: Negative for chest pain and palpitations.   6. Gastrointestinal: Negative for abdominal distention, abdominal pain and blood in stool.   7. Endocrine: Negative for cold intolerance and  "heat intolerance.   8. Genitourinary: Negative for difficulty urinating, dysuria and frequency.   9. Musculoskeletal: Chronic arthralgias, back pain and myalgias.   10. Skin: Negative for color change, rash and wound.   11. Neurological: Negative for syncope, weakness and headaches.   12. Hematological: Negative for adenopathy. Does not bruise/bleed easily.   13. Psychiatric/Behavioral: Negative for confusion. The patient is not nervous/anxious.    Objective     Physical Exam   Vital Signs: /88   Pulse 57   Temp 97.5 °F (36.4 °C)   Ht 167.6 cm (66\")   Wt 93 kg (205 lb)   SpO2 99%   BMI 33.09 kg/m²     General Appearance: alert, oriented x 3, no acute distress.  Skin: warm and dry.   HEENT: Atraumatic.  pupils round and reactive to light and accommodation, oral mucosa pink and moist.  Nares patent without epistaxis.  External auditory canals are patent tympanic membranes intact.  Neck: supple, no JVD, trachea midline.  No thyromegaly  Lungs: CTA, unlabored breathing effort.  Heart: RRR, normal S1 and S2, no S3, no rub.  Abdomen: soft, non-tender, no palpable bladder, present bowel sounds to auscultation ×4.  No guarding or rigidity.  Extremities: no clubbing, cyanosis or edema.  Good range of motion actively and passively.  Symmetric muscle strength and development  Neuro: normal speech and mental status.  Cranial nerves II through XII intact.  No anosmia. DTR 2+; proprioception intact.  No focal motor/sensory deficits.    Assessment and Plan      Assessment:   Diagnoses and all orders for this visit:    1. Morning stiffness of joints (Primary)  -     CBC & Differential; Future  -     Comprehensive Metabolic Panel  -     TSH  -     T4, Free  -     Iron Profile  -     Uric acid  -     Antistreptolysin O screen  -     Rheumatoid factor  -     C-reactive protein  -     MARIO  -     Ferritin    2. Polyarthralgia  -     CBC & Differential; Future  -     Comprehensive Metabolic Panel  -     TSH  -     T4, Free  - "     Iron Profile  -     Uric acid  -     Antistreptolysin O screen  -     Rheumatoid factor  -     C-reactive protein  -     MARIO  -     Ferritin    3. Myalgia  -     CBC & Differential; Future  -     Comprehensive Metabolic Panel  -     TSH  -     T4, Free  -     Iron Profile  -     Uric acid  -     Antistreptolysin O screen  -     Rheumatoid factor  -     C-reactive protein  -     MARIO  -     Ferritin    4. Gastroesophageal reflux disease without esophagitis  -     Discontinue: pantoprazole (Protonix) 40 MG EC tablet; Take 1 tablet by mouth Daily.  Dispense: 90 tablet; Refill: 1    5. Iron deficiency  -     CBC & Differential; Future  -     Iron Profile  -     Ferritin    6. Primary insomnia  -     temazepam (RESTORIL) 15 MG capsule; Take 1 capsule by mouth At Night As Needed for Sleep.  Dispense: 30 capsule; Refill: 0    7. Family history of autoimmune disorder  -     CBC & Differential; Future  -     Comprehensive Metabolic Panel  -     TSH  -     T4, Free  -     Iron Profile  -     Uric acid  -     Antistreptolysin O screen  -     Rheumatoid factor  -     C-reactive protein  -     MARIO  -     Ferritin        Plan:  Inflammatory work-up, plan to follow results of laboratory evaluation.    Planned trial of temazepam to aid in her sleep disturbance.  Consider utilization of oxazepam if needed.    Plan trial of pantoprazole in treatment of her GERD symptoms.  Discussion Summary:  Anti - reflux measures, trigger foods and drinks to avoid: Fatty foods, alcohol, chocolate, coffee, tea, caffeinated soft drinks (decaffeinated coffee still has some caffeine), peppermint and spearmint, spices and vinegar, citrus fruits and juices, tomatoes and tomato sauces, and smoking. Other antireflux measures include weight reduction if overweight, avoid tight clothing around the abdomen, elevate the head of her bed 6 inches (May use a bed wedge which is placed between the mattress in box [place]) or blocks under the head of the bed,  don't drink or eat for 2 hours before going to bed and avoid lying down immediately after meals.    Discussed plan of care in detail with pt today; pt verb understanding and agrees.  Follow up:  Return in about 2 weeks (around 12/21/2020) for Recheck, Med Change/New Meds.     There are no Patient Instructions on file for this visit.    Sarthak Patel, DO  12/30/20  10:37 EST          Please note that portions of this note may have been completed with a voice recognition program. Efforts were made to edit the dictations, but occasionally words are mistranscribed.

## 2021-01-04 ENCOUNTER — TRANSCRIBE ORDERS (OUTPATIENT)
Dept: ADMINISTRATIVE | Facility: HOSPITAL | Age: 46
End: 2021-01-04

## 2021-01-04 DIAGNOSIS — Z12.31 VISIT FOR SCREENING MAMMOGRAM: Primary | ICD-10-CM

## 2021-01-05 ENCOUNTER — OFFICE VISIT (OUTPATIENT)
Dept: SURGERY | Facility: CLINIC | Age: 46
End: 2021-01-05

## 2021-01-05 VITALS
BODY MASS INDEX: 33.59 KG/M2 | HEIGHT: 66 IN | WEIGHT: 209 LBS | HEART RATE: 92 BPM | DIASTOLIC BLOOD PRESSURE: 70 MMHG | TEMPERATURE: 96.8 F | OXYGEN SATURATION: 98 % | SYSTOLIC BLOOD PRESSURE: 128 MMHG

## 2021-01-05 DIAGNOSIS — E04.1 THYROID NODULE: Primary | ICD-10-CM

## 2021-01-05 DIAGNOSIS — Z01.818 PRE-OP TESTING: Primary | ICD-10-CM

## 2021-01-05 DIAGNOSIS — R13.19 ESOPHAGEAL DYSPHAGIA: ICD-10-CM

## 2021-01-05 PROCEDURE — 99244 OFF/OP CNSLTJ NEW/EST MOD 40: CPT | Performed by: SURGERY

## 2021-01-05 PROCEDURE — 10005 FNA BX W/US GDN 1ST LES: CPT | Performed by: SURGERY

## 2021-01-05 NOTE — PROGRESS NOTES
Patient: Jesica Green    YOB: 1975    Date: 01/05/2021    Primary Care Provider: Sarthak Patel DO    Chief Complaint   Patient presents with   • Thyroid Problem     thyroid nodules        SUBJECTIVE:    History of present illness:  Patient is here for evaluation of a palpable left neck mass.  Patient had soft tissue ultrasound performed 08/21/2020 which showed a 1.2 cm nodule in the left lobe of the thyroid.  Patient complains of intermittent swelling in her throat with associated difficult swallowing. She states she has had symptoms since August.     Apparently the patient had noticed history significant for difficulty swallowing over the past several months.  She did have a thyroid ultrasound performed recently that showed evidence of the above-mentioned thyroid nodule.  She does give a history significant for occasional reflux.    Patient's Body mass index is 33.75 kg/m². BMI is above normal parameters. Recommendations include: educational material and exercise counseling.    The following portions of the patient's history were reviewed and updated as appropriate: allergies, current medications, past family history, past medical history, past social history, past surgical history and problem list.      Review of Systems   Constitutional: Negative for chills, fever and unexpected weight change.   HENT: Positive for trouble swallowing. Negative for hearing loss and voice change.    Eyes: Negative for visual disturbance.   Respiratory: Negative for apnea, cough, chest tightness, shortness of breath and wheezing.    Cardiovascular: Negative for chest pain, palpitations and leg swelling.   Gastrointestinal: Negative for abdominal distention, abdominal pain, anal bleeding, blood in stool, constipation, diarrhea, nausea, rectal pain and vomiting.   Endocrine: Negative for cold intolerance and heat intolerance.   Genitourinary: Negative for difficulty urinating, dysuria and flank pain.    Musculoskeletal: Negative for back pain and gait problem.   Skin: Negative for color change, rash and wound.   Neurological: Negative for dizziness, syncope, speech difficulty, weakness, light-headedness, numbness and headaches.   Hematological: Negative for adenopathy. Does not bruise/bleed easily.   Psychiatric/Behavioral: Negative for confusion. The patient is not nervous/anxious.        Allergies:  Allergies   Allergen Reactions   • Ppd [Tuberculin Purified Protein Derivative] Rash       Medications:    Current Outpatient Medications:   •  omeprazole (priLOSEC) 40 MG capsule, Take 1 capsule by mouth Daily., Disp: 90 capsule, Rfl: 1  •  rOPINIRole (REQUIP) 0.25 MG tablet, 0.5 tab po qhs x 2 nights; then 1 tab po qhs x next 5 nights; then inc to 2 tabs po qhs each night thereafter, Disp: 60 tablet, Rfl: 1    History:  Past Medical History:   Diagnosis Date   • Acid reflux    • Anemia    • Asthma    • History of Papanicolaou smear of cervix 2015   • Positive PPD        Past Surgical History:   Procedure Laterality Date   • ACHILLES TENDON REPAIR     • REDUCTION MAMMAPLASTY  2000   • TONSILLECTOMY         Family History   Problem Relation Age of Onset   • Thyroid disease Mother    • Hyperlipidemia Mother    • Breast cancer Maternal Aunt    • Cervical cancer Maternal Aunt    • Hypertension Father    • Hyperlipidemia Father    • Hyperlipidemia Maternal Grandmother    • Rheum arthritis Other    • Cancer Other         colon ovarian and breast       Social History     Tobacco Use   • Smoking status: Never Smoker   • Smokeless tobacco: Never Used   Substance Use Topics   • Alcohol use: Yes     Alcohol/week: 2.0 standard drinks     Types: 2 Glasses of wine per week     Frequency: 2-4 times a month     Drinks per session: 1 or 2     Binge frequency: Never   • Drug use: No        OBJECTIVE:    Vital Signs:   Vitals:    01/05/21 1350   BP: 128/70   Pulse: 92   Temp: 96.8 °F (36 °C)   SpO2: 98%   Weight: 94.8 kg (209 lb)  "  Height: 167.6 cm (65.98\")       Physical Exam:   General Appearance:    Alert, cooperative, in no acute distress   Head:    Normocephalic, without obvious abnormality, atraumatic   Eyes:            Lids and lashes normal, conjunctivae and sclerae normal, no   icterus, no pallor, corneas clear, PERRLA   Ears:    Ears appear intact with no abnormalities noted   Throat:   No oral lesions, no thrush, oral mucosa moist   Neck:   No adenopathy, supple, trachea midline, no thyromegaly, no   carotid bruit, no JVD   Lungs:     Clear to auscultation,respirations regular, even and                  unlabored    Heart:    Regular rhythm and normal rate, normal S1 and S2, no            murmur, no gallop, no rub, no click   Chest Wall:    No abnormalities observed   Abdomen:     Normal bowel sounds, no masses, no organomegaly, soft        non-tender, non-distended, no guarding, no rebound                tenderness, no peritoneal signs   Extremities:   Moves all extremities well, no edema, no cyanosis, no             redness   Pulses:   Pulses palpable and equal bilaterally   Skin:   No bleeding, bruising or rash   Lymph nodes:   No palpable adenopathy   Neurologic:   Cranial nerves 2 - 12 grossly intact, sensation intact, DTR       present and equal bilaterally     Results Review:   I reviewed the patient's new clinical results.  I reviewed the patient's new imaging results and agree with the interpretation.  I reviewed the patient's other test results and agree with the interpretation    Review of Systems was reviewed and confirmed as accurate as documented by the MA.    ASSESSMENT/PLAN:    1. Thyroid nodule    2. Esophageal dysphagia        In short, I did have a detailed and extensive discussion with the patient in the office today.  We did fine needle aspirate a small 1 cm nodule of the left thyroid gland which I believe is asymptomatic, I do not think this is causing her any symptomatology and I will see her back in the " office in 2 weeks as a follow-up from her cytopathology report.    As far as her reflux is concerned and difficulty swallowing she needs to undergo upper endoscopy and then possible future barium swallow with esophagram.  Risk and benefits of operative versus nonoperative intervention been discussed with the patient, she understands and agrees, and wishes to proceed.    I discussed the patients findings and my recommendations with patient        Electronically signed by Jose Luis Zacarias MD  01/05/21        Portions of this note has been scribed for Jose Luis Zacarias MD by Becky Nguyen. 1/5/2021  14:26 EST

## 2021-01-19 ENCOUNTER — OFFICE VISIT (OUTPATIENT)
Dept: FAMILY MEDICINE CLINIC | Facility: CLINIC | Age: 46
End: 2021-01-19

## 2021-01-19 VITALS
BODY MASS INDEX: 34.55 KG/M2 | SYSTOLIC BLOOD PRESSURE: 125 MMHG | HEIGHT: 66 IN | WEIGHT: 215 LBS | DIASTOLIC BLOOD PRESSURE: 80 MMHG | HEART RATE: 81 BPM | OXYGEN SATURATION: 100 % | TEMPERATURE: 97.3 F

## 2021-01-19 DIAGNOSIS — R53.83 MALAISE AND FATIGUE: ICD-10-CM

## 2021-01-19 DIAGNOSIS — G47.01 INSOMNIA DUE TO MEDICAL CONDITION: ICD-10-CM

## 2021-01-19 DIAGNOSIS — L30.9 DERMATITIS: ICD-10-CM

## 2021-01-19 DIAGNOSIS — R13.19 ESOPHAGEAL DYSPHAGIA: ICD-10-CM

## 2021-01-19 DIAGNOSIS — M25.60 MORNING JOINT STIFFNESS OF MULTIPLE SITES: Primary | ICD-10-CM

## 2021-01-19 DIAGNOSIS — R60.1 GENERALIZED EDEMA: ICD-10-CM

## 2021-01-19 DIAGNOSIS — K21.9 GASTROESOPHAGEAL REFLUX DISEASE WITHOUT ESOPHAGITIS: ICD-10-CM

## 2021-01-19 DIAGNOSIS — R68.2 DRY MOUTH: ICD-10-CM

## 2021-01-19 DIAGNOSIS — M25.50 POLYARTHRALGIA: ICD-10-CM

## 2021-01-19 DIAGNOSIS — R53.81 MALAISE AND FATIGUE: ICD-10-CM

## 2021-01-19 PROCEDURE — 99214 OFFICE O/P EST MOD 30 MIN: CPT | Performed by: FAMILY MEDICINE

## 2021-01-19 RX ORDER — DIAZEPAM 5 MG/1
5 TABLET ORAL NIGHTLY PRN
Qty: 30 TABLET | Refills: 0 | Status: SHIPPED | OUTPATIENT
Start: 2021-01-19 | End: 2021-03-02 | Stop reason: SDUPTHER

## 2021-01-19 RX ORDER — FUROSEMIDE 20 MG/1
20 TABLET ORAL EVERY MORNING
Qty: 30 TABLET | Refills: 1 | Status: SHIPPED | OUTPATIENT
Start: 2021-01-19 | End: 2021-03-16 | Stop reason: SDUPTHER

## 2021-01-19 RX ORDER — POTASSIUM CHLORIDE 750 MG/1
10 TABLET, FILM COATED, EXTENDED RELEASE ORAL DAILY
Qty: 30 TABLET | Refills: 1 | Status: SHIPPED | OUTPATIENT
Start: 2021-01-19 | End: 2021-03-16 | Stop reason: SDUPTHER

## 2021-01-19 NOTE — PROGRESS NOTES
Established Patient        Chief Complaint:   Chief Complaint   Patient presents with   • Follow-up     4 week follow up. patient states that she is having increased joint pain and swelling. patient also states that the sleeping medication that she was given does not seem to be helping.   • Rash     Complaints of sore like rash on her scalp and behind her ears. patient states that this has been on going for about 5 months, but seems to be getting increasingly worse.        Jesica Green is a 46 y.o. female    History of Present Illness:   Here today for scheduled follow-up visit concerning her continued sleep difficulties, as well as malaise/fatigue and morning stiffness with associated polyarthralgias.  Patient reports an increase in thirst, described as a dry mouth persistently.  Patient states her morning stiffness is in excess of 1 hour daily.  She has numerous episodes of myalgias and arthralgias that seem to be worse in the evening.  She denies any new trauma or injury.  She continues to have difficulty with weight loss, she has made concerted efforts to decrease her caloric intake throughout the course of the day, however she admits to only eating predominantly twice daily.  She continues to make efforts to improve her activity level, however she is felt that malaise/fatigue is continue to be problematic for her.  She has had difficulty with hair loss, described as easy shedding additionally.  She has developed some erythematous rashes periodically to the dorsum of bilateral hands, as well as to the postauricular areas.  Denies any contact irritants.  She did not respond to utilization of trazodone or ropinirole in minimizing her symptoms.  She does continue to have symptoms of esophageal dysphagia, has a planned endoscopy on 1/22/2021, Dr. Zacarias will be performing this.    Subjective     The following portions of the patient's history were reviewed and updated as appropriate: allergies, current  "medications, past family history, past medical history, past social history, past surgical history and problem list.    Allergies   Allergen Reactions   • Ppd [Tuberculin Purified Protein Derivative] Rash       Review of Systems  1. Constitutional: Negative for fever. Negative for chills, diaphoresis.  Malaise/fatigue as per above, and unexpected weight change.   2. HENT: No dysphagia; no changes to vision/hearing/smell/taste; no epistaxis  3. Eyes: Negative for redness and visual disturbance.   4. Respiratory: negative for shortness of breath. Negative for chest pain . Negative for cough and chest tightness.   5. Cardiovascular: Negative for chest pain and palpitations.   6. Gastrointestinal: As per above.  7. Endocrine: Negative for cold intolerance and heat intolerance.   8. Genitourinary: Negative for difficulty urinating, dysuria and frequency.   9. Musculoskeletal: Chronic arthralgias, back pain and myalgias.  Morning stiffness as per above.  10. Skin: Negative for color change, rash and wound.   11. Neurological: Negative for syncope, weakness and headaches.   12. Hematological: Negative for adenopathy. Does not bruise/bleed easily.   13. Psychiatric/Behavioral: Negative for confusion. The patient is not nervous/anxious.  Sleep difficulties as per above.    Objective     Physical Exam   Vital Signs: /80   Pulse 81   Temp 97.3 °F (36.3 °C)   Ht 167.6 cm (65.98\")   Wt 97.5 kg (215 lb)   SpO2 100%   BMI 34.72 kg/m²     General Appearance: alert, oriented x 3, no acute distress.  Skin: warm and dry.   HEENT: Atraumatic.  pupils round and reactive to light and accommodation, oral mucosa dry, tacky; noted nodular changes to the gingiva.  Nares patent without epistaxis.  External auditory canals are patent tympanic membranes intact.  Neck: supple, no JVD, trachea midline.  No thyromegaly.  Nodular change noted to the thyroid.  Lungs: CTA, unlabored breathing effort.  Heart: RRR, normal S1 and S2, no S3, " no rub.  Abdomen: soft, non-tender, no palpable bladder, present bowel sounds to auscultation ×4.  No guarding or rigidity.  No CVA tenderness.  Extremities: no clubbing, cyanosis.  Good range of motion actively and passively.  Symmetric muscle strength and development.  Mild synovitis noted on palpation to interphalangeal joints of bilateral hands.  Symmetric  strength bilaterally.  Normal supination/pronation of bilateral forearms, as well as extension/flexion of bilateral elbows.  Normal deltoid tone and development.  No leg length discrepancies.  Nonpitting edematous changes that extends to the knees bilaterally.  Neuro: normal speech and mental status.  Cranial nerves II through XII intact.  No anosmia. DTR 2+; proprioception intact.  No focal motor/sensory deficits.    Assessment and Plan      Assessment:   Diagnoses and all orders for this visit:    1. Morning joint stiffness of multiple sites (Primary)  -     Cancel: Ambulatory Referral to Rheumatology  -     Ambulatory Referral to Rheumatology    2. Dry mouth  -     Cancel: Ambulatory Referral to Rheumatology  -     Ambulatory Referral to Rheumatology    3. Insomnia due to medical condition  -     diazePAM (VALIUM) 5 MG tablet; Take 1 tablet by mouth At Night As Needed for Sleep.  Dispense: 30 tablet; Refill: 0    4. Dermatitis  -     Cancel: Ambulatory Referral to Rheumatology  -     Ambulatory Referral to Rheumatology    5. Malaise and fatigue  -     Cancel: Ambulatory Referral to Rheumatology  -     Ambulatory Referral to Rheumatology    6. Gastroesophageal reflux disease without esophagitis    7. Esophageal dysphagia    8. Polyarthralgia  -     Cancel: Ambulatory Referral to Rheumatology  -     Ambulatory Referral to Rheumatology    9. Generalized edema  -     furosemide (Lasix) 20 MG tablet; Take 1 tablet by mouth Every Morning.  Dispense: 30 tablet; Refill: 1  -     potassium chloride 10 MEQ CR tablet; Take 1 tablet by mouth Daily.  Dispense: 30  tablet; Refill: 1        Plan:  I have asked that patient continue her planned endoscopy for later this week, as I do suspect insidious reflux is led to esophageal stricture.  This would account for her esophageal dysphagia, as I do not feel the nodule to her left thyroid is large enough to explain her symptoms.    I recommended a referral to rheumatology, urgent if able.  She has a myriad of rheumatological symptoms, and now involving dry mouth and gingival changes.  Do suspect she may be suffering from Sjogren's syndrome.  This would certainly explain her neurological as well as musculoskeletal symptoms.    I have elected to utilize diazepam in an effort to improve her sleep at night.  She verbalized understanding of the mechanism of action of the medication.  Should she develop any ill effects, she will notify the office immediately for potential change in treatment regimen.    Given her generalized edematous changes and continued weight gain, I recommended low-dose Lasix, to be taken daily in the morning.  Of also provided potassium supplementation.  She will need surveillance labs at follow-up visit.  Discussion Summary:  I spent 30 minutes caring for Jesica on this date of service. This time includes time spent by me in the following activities:preparing for the visit, performing a medically appropriate examination and/or evaluation , counseling and educating the patient/family/caregiver, ordering medications, tests, or procedures, referring and communicating with other health care professionals , documenting information in the medical record and care coordination     Discussed plan of care in detail with pt today; pt verb understanding and agrees.  Follow up:  Return in about 3 weeks (around 2/9/2021) for Recheck, Med Change/New Meds.     There are no Patient Instructions on file for this visit.    Sarthak Patel DO  01/19/21  18:01 EST          Please note that portions of this note may have been completed  with a voice recognition program. Efforts were made to edit the dictations, but occasionally words are mistranscribed.

## 2021-01-20 ENCOUNTER — LAB (OUTPATIENT)
Dept: LAB | Facility: HOSPITAL | Age: 46
End: 2021-01-20

## 2021-01-20 DIAGNOSIS — Z01.818 PRE-OP TESTING: ICD-10-CM

## 2021-01-20 PROCEDURE — U0004 COV-19 TEST NON-CDC HGH THRU: HCPCS

## 2021-01-20 PROCEDURE — C9803 HOPD COVID-19 SPEC COLLECT: HCPCS

## 2021-01-21 LAB — SARS-COV-2 RNA RESP QL NAA+PROBE: NOT DETECTED

## 2021-01-22 ENCOUNTER — OUTSIDE FACILITY SERVICE (OUTPATIENT)
Dept: SURGERY | Facility: CLINIC | Age: 46
End: 2021-01-22

## 2021-01-22 DIAGNOSIS — R13.19 ESOPHAGEAL DYSPHAGIA: Primary | ICD-10-CM

## 2021-01-22 PROCEDURE — 43239 EGD BIOPSY SINGLE/MULTIPLE: CPT | Performed by: SURGERY

## 2021-02-02 ENCOUNTER — TRANSCRIBE ORDERS (OUTPATIENT)
Dept: LAB | Facility: HOSPITAL | Age: 46
End: 2021-02-02

## 2021-02-02 ENCOUNTER — LAB (OUTPATIENT)
Dept: LAB | Facility: HOSPITAL | Age: 46
End: 2021-02-02

## 2021-02-02 DIAGNOSIS — Z01.818 PRE-OP TESTING: Primary | ICD-10-CM

## 2021-02-02 DIAGNOSIS — Z01.818 PRE-OP TESTING: ICD-10-CM

## 2021-02-02 PROCEDURE — U0004 COV-19 TEST NON-CDC HGH THRU: HCPCS

## 2021-02-02 PROCEDURE — C9803 HOPD COVID-19 SPEC COLLECT: HCPCS

## 2021-02-03 ENCOUNTER — TELEPHONE (OUTPATIENT)
Dept: SURGERY | Facility: CLINIC | Age: 46
End: 2021-02-03

## 2021-02-03 LAB — SARS-COV-2 RNA RESP QL NAA+PROBE: DETECTED

## 2021-02-03 NOTE — TELEPHONE ENCOUNTER
"Pt had \"detected\" Covid 19 test performed earlier today, UGI and follow-up visit with Dr. Zacarias was cancelled, she will call back to reschedule all.  "

## 2021-02-05 ENCOUNTER — HOSPITAL ENCOUNTER (OUTPATIENT)
Dept: GENERAL RADIOLOGY | Facility: HOSPITAL | Age: 46
End: 2021-02-05

## 2021-02-05 ENCOUNTER — APPOINTMENT (OUTPATIENT)
Dept: MAMMOGRAPHY | Facility: HOSPITAL | Age: 46
End: 2021-02-05

## 2021-03-02 ENCOUNTER — OFFICE VISIT (OUTPATIENT)
Dept: FAMILY MEDICINE CLINIC | Facility: CLINIC | Age: 46
End: 2021-03-02

## 2021-03-02 VITALS
HEIGHT: 66 IN | DIASTOLIC BLOOD PRESSURE: 70 MMHG | BODY MASS INDEX: 33.91 KG/M2 | TEMPERATURE: 97.7 F | HEART RATE: 66 BPM | OXYGEN SATURATION: 99 % | SYSTOLIC BLOOD PRESSURE: 120 MMHG | WEIGHT: 211 LBS

## 2021-03-02 DIAGNOSIS — R53.81 MALAISE AND FATIGUE: ICD-10-CM

## 2021-03-02 DIAGNOSIS — M32.9 SYSTEMIC LUPUS ERYTHEMATOSUS, UNSPECIFIED SLE TYPE, UNSPECIFIED ORGAN INVOLVEMENT STATUS (HCC): Primary | ICD-10-CM

## 2021-03-02 DIAGNOSIS — M25.50 POLYARTHRALGIA: ICD-10-CM

## 2021-03-02 DIAGNOSIS — G47.01 INSOMNIA DUE TO MEDICAL CONDITION: ICD-10-CM

## 2021-03-02 DIAGNOSIS — R53.83 MALAISE AND FATIGUE: ICD-10-CM

## 2021-03-02 DIAGNOSIS — M25.60 MORNING JOINT STIFFNESS OF MULTIPLE SITES: ICD-10-CM

## 2021-03-02 DIAGNOSIS — K21.9 GASTROESOPHAGEAL REFLUX DISEASE WITHOUT ESOPHAGITIS: ICD-10-CM

## 2021-03-02 PROCEDURE — 99214 OFFICE O/P EST MOD 30 MIN: CPT | Performed by: FAMILY MEDICINE

## 2021-03-02 RX ORDER — OMEPRAZOLE 40 MG/1
40 CAPSULE, DELAYED RELEASE ORAL DAILY
Qty: 90 CAPSULE | Refills: 3 | Status: SHIPPED | OUTPATIENT
Start: 2021-03-02 | End: 2021-06-14 | Stop reason: SDUPTHER

## 2021-03-02 RX ORDER — DIAZEPAM 10 MG/1
10 TABLET ORAL NIGHTLY PRN
Qty: 30 TABLET | Refills: 2
Start: 2021-03-02 | End: 2021-03-04 | Stop reason: SDUPTHER

## 2021-03-02 RX ORDER — DIAZEPAM 5 MG/1
5 TABLET ORAL NIGHTLY PRN
Qty: 30 TABLET | Refills: 0 | Status: CANCELLED | OUTPATIENT
Start: 2021-03-02

## 2021-03-02 NOTE — PROGRESS NOTES
Established Patient        Chief Complaint:   Chief Complaint   Patient presents with   • Follow-up     insomnia; patient falls asleep easily with Diazepam, but is unable to stay asleep        Jesica Green is a 46 y.o. female    History of Present Illness:   Answers for HPI/ROS submitted by the patient on 3/2/2021   What is the primary reason for your visit?: Other  Please describe your symptoms.: Same  Have you had these symptoms before?: Yes  How long have you been having these symptoms?: Greater than 2 weeks  Here for scheduled follow-up visit of her insomnia as well as newly diagnosed lupus.  Patient states she has found the diazepam provides substantial improvement to sleep induction, still does demonstrate some difficulties with sleep maintenance at times, she describes it is not frequent awakenings, but rather awakening earlier than planned.  She underwent a barrage of laboratory evaluation by rheumatology.  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    Subjective     The following portions of the patient's history were reviewed and updated as appropriate: allergies, current medications, past family history, past medical history, past social history, past surgical history and problem list.    Allergies   Allergen Reactions   • Ppd [Tuberculin Purified Protein Derivative] Rash       Review of Systems  1. Constitutional: Negative for fever. Negative for chills, diaphoresis.  Malaise/fatigue as per above, and unexpected weight change.   2. HENT: No dysphagia; no changes to vision/hearing/smell/taste; no epistaxis  3. Eyes: Negative for redness and visual disturbance.   4. Respiratory: negative for shortness of breath. Negative for chest pain . Negative for cough and chest tightness.   5. Cardiovascular: Negative for chest pain and palpitations.   6. Gastrointestinal: As per above.  7. Endocrine: Negative for cold intolerance and heat intolerance.  "  8. Genitourinary: Negative for difficulty urinating, dysuria and frequency.   9. Musculoskeletal: Chronic arthralgias, back pain and myalgias.  Morning stiffness as per above.  10. Skin: Negative for color change, rash and wound.   11. Neurological: Negative for syncope, weakness and headaches.   12. Hematological: Negative for adenopathy. Does not bruise/bleed easily.   13. Psychiatric/Behavioral: Negative for confusion. The patient is not nervous/anxious.  Sleep difficulties as per above.    Objective     Physical Exam   Vital Signs: /70   Pulse 66   Temp 97.7 °F (36.5 °C)   Ht 167.6 cm (66\")   Wt 95.7 kg (211 lb)   SpO2 99%   BMI 34.06 kg/m²     General Appearance: alert, oriented x 3, no acute distress.  Skin: warm and dry.   HEENT: Atraumatic.  pupils round and reactive to light and accommodation, oral mucosa dry, tacky; noted nodular changes to the gingiva.  Nares patent without epistaxis.  External auditory canals are patent tympanic membranes intact.  Neck: supple, no JVD, trachea midline.  No thyromegaly.  Nodular change noted to the thyroid.  Lungs: CTA, unlabored breathing effort.  Heart: RRR, normal S1 and S2, no S3, no rub.  Abdomen: soft, non-tender, no palpable bladder, present bowel sounds to auscultation ×4.  No guarding or rigidity.  No CVA tenderness.  Extremities: no clubbing, cyanosis.  Good range of motion actively and passively.  Symmetric muscle strength and development.  Mild synovitis noted on palpation to interphalangeal joints of bilateral hands.  Symmetric  strength bilaterally.  Normal supination/pronation of bilateral forearms, as well as extension/flexion of bilateral elbows.  Normal deltoid tone and development.  No leg length discrepancies.  Nonpitting edematous changes that extends to the knees bilaterally.  Neuro: normal speech and mental status.  Cranial nerves II through XII intact.  No anosmia. DTR 2+; proprioception intact.  No focal motor/sensory " deficits.    Assessment and Plan      Assessment:   Diagnoses and all orders for this visit:    1. Systemic lupus erythematosus, unspecified SLE type, unspecified organ involvement status (CMS/[facility]) (Primary)    2. Insomnia due to medical condition  -     diazePAM (VALIUM) 10 MG tablet; Take 1 tablet by mouth At Night As Needed for Sleep.  Dispense: 30 tablet; Refill: 2    3. Gastroesophageal reflux disease without esophagitis  -     omeprazole (priLOSEC) 40 MG capsule; Take 1 capsule by mouth Daily.  Dispense: 90 capsule; Refill: 3    4. Malaise and fatigue    5. Morning joint stiffness of multiple sites    6. Polyarthralgia    Other orders  -     Cancel: diazePAM (VALIUM) 5 MG tablet; Take 1 tablet by mouth At Night As Needed for Sleep.  Dispense: 30 tablet; Refill: 0        Plan:  I have asked that patient schedule a sooner than already anticipated follow-up visit with rheumatology.  Her laboratory evaluation does demonstrate likely lupus diagnosis.  Patient is open to any form of therapeutic treatment available, as the physical symptoms and limitations as a result of this probable diagnosis have created substantial difficulties for her.  Patient will be calling rheumatology to schedule a follow-up visit as soon as able.    I recommended a trial of increased dosing of her diazepam to 7.5 mg, she will utilize 1-1/2 tablets of her current 5 mg dosing to achieve this.  If tolerated/needed, she will request a refill for the 10 mg strength.  If she is satisfied with the response to 7.5 mg dosing, we will change her prescription and send a refill in for that dosing.    Vital signs are stable, blood pressure is at goal.  I am pleased with her small amount of weight loss since last visit.  She continues to make numerous changes to her diet/exercise program in an effort to achieve an overall healthier lifestyle, certainly limited by the physical symptoms of her suspected lupus.    Discussion Summary:  I spent 30 minutes  caring for Jesica on this date of service. This time includes time spent by me in the following activities:preparing for the visit, performing a medically appropriate examination and/or evaluation , counseling and educating the patient/family/caregiver, ordering medications, tests, or procedures, documenting information in the medical record and care coordination     Discussed plan of care in detail with pt today; pt verb understanding and agrees.  Follow up:  Return in about 3 months (around 6/2/2021) for Recheck.     There are no Patient Instructions on file for this visit.    Sarthak Patel,   03/02/21  14:06 EST          Please note that portions of this note may have been completed with a voice recognition program. Efforts were made to edit the dictations, but occasionally words are mistranscribed.

## 2021-03-04 DIAGNOSIS — G47.01 INSOMNIA DUE TO MEDICAL CONDITION: ICD-10-CM

## 2021-03-04 RX ORDER — DIAZEPAM 10 MG/1
10 TABLET ORAL NIGHTLY PRN
Qty: 30 TABLET | Refills: 2 | Status: SHIPPED | OUTPATIENT
Start: 2021-03-04 | End: 2021-06-14 | Stop reason: SDUPTHER

## 2021-03-04 NOTE — TELEPHONE ENCOUNTER
Caller: Jesica Green Onofre    Relationship: Self    Best call back number: [phone]    Medication needed:   Requested Prescriptions     Pending Prescriptions Disp Refills   • diazePAM (VALIUM) 10 MG tablet 30 tablet 2     Sig: Take 1 tablet by mouth At Night As Needed.       What details did the patient provide when requesting the medication: PATIENT CALLED THE PHARMACY AND THEY DO NOT HAVE THE PRESCRIPTION  SHE HAS 2 DAYS  LEFT OF MEDICATION   Does the patient have less than a 3 day supply:  [x] Yes  [] No    What is the patient's preferred pharmacy: [id number] [place] [address] [address] AT [facility] [address] - [phone]  - [phone] FX

## 2021-03-16 ENCOUNTER — HOSPITAL ENCOUNTER (OUTPATIENT)
Dept: MAMMOGRAPHY | Facility: HOSPITAL | Age: 46
Discharge: HOME | End: 2021-03-16
Admitting: PHYSICIAN ASSISTANT

## 2021-03-16 DIAGNOSIS — R60.1 GENERALIZED EDEMA: ICD-10-CM

## 2021-03-16 DIAGNOSIS — Z12.31 VISIT FOR SCREENING MAMMOGRAM: ICD-10-CM

## 2021-03-16 PROCEDURE — 77067 SCR MAMMO BI INCL CAD: CPT

## 2021-03-16 PROCEDURE — 77063 BREAST TOMOSYNTHESIS BI: CPT

## 2021-03-16 RX ORDER — POTASSIUM CHLORIDE 750 MG/1
10 TABLET, FILM COATED, EXTENDED RELEASE ORAL DAILY
Qty: 30 TABLET | Refills: 3 | Status: SHIPPED | OUTPATIENT
Start: 2021-03-16 | End: 2021-06-14 | Stop reason: SDUPTHER

## 2021-03-16 RX ORDER — FUROSEMIDE 20 MG/1
20 TABLET ORAL EVERY MORNING
Qty: 30 TABLET | Refills: 3 | Status: SHIPPED | OUTPATIENT
Start: 2021-03-16 | End: 2021-06-14 | Stop reason: SDUPTHER

## 2021-04-13 ENCOUNTER — OFFICE VISIT (OUTPATIENT)
Dept: OBSTETRICS AND GYNECOLOGY | Facility: CLINIC | Age: 46
End: 2021-04-13

## 2021-04-13 VITALS
HEIGHT: 66 IN | DIASTOLIC BLOOD PRESSURE: 78 MMHG | WEIGHT: 211.4 LBS | BODY MASS INDEX: 33.97 KG/M2 | SYSTOLIC BLOOD PRESSURE: 130 MMHG

## 2021-04-13 DIAGNOSIS — Z12.4 SCREENING FOR CERVICAL CANCER: ICD-10-CM

## 2021-04-13 DIAGNOSIS — Z01.419 ENCOUNTER FOR GYNECOLOGICAL EXAMINATION WITHOUT ABNORMAL FINDING: Primary | ICD-10-CM

## 2021-04-13 PROCEDURE — 99396 PREV VISIT EST AGE 40-64: CPT | Performed by: PHYSICIAN ASSISTANT

## 2021-04-13 RX ORDER — HYDROXYCHLOROQUINE SULFATE 200 MG/1
200 TABLET, FILM COATED ORAL 2 TIMES DAILY
COMMUNITY
Start: 2021-03-12 | End: 2022-03-14 | Stop reason: SDUPTHER

## 2021-04-13 NOTE — PROGRESS NOTES
Subjective   Chief Complaint   Patient presents with   • Gynecologic Exam     Last pap done 19-WNL, MMG done 2 weeks ago-WNL, No complaints       Jesica Green is a 46 y.o. year old  presenting to be seen for her annual gynecological exam.   She has no complaints or concerns.  She has been diagnosed recently with lupus and mixed connective tissue disorder  Her LMP was 1 week ago and she has a regular monthly bleed that is not heavy but sometimes last 7 to 8 days.  She had a normal screening mammogram 2 weeks ago.   has had vasectomy.      Past Medical History:   Diagnosis Date   • Acid reflux    • Anemia    • Asthma    • History of Papanicolaou smear of cervix    • Lupus (CMS/HCC)    • MCTD (mixed connective tissue disease) (CMS/HCC)    • Positive PPD         Current Outpatient Medications:   •  diazePAM (VALIUM) 10 MG tablet, Take 1 tablet by mouth At Night As Needed for Anxiety or Muscle Spasms (insomnia)., Disp: 30 tablet, Rfl: 2  •  furosemide (Lasix) 20 MG tablet, Take 1 tablet by mouth Every Morning., Disp: 30 tablet, Rfl: 3  •  hydroxychloroquine (PLAQUENIL) 200 MG tablet, Take 200 mg by mouth 2 (Two) Times a Day., Disp: , Rfl:   •  omeprazole (priLOSEC) 40 MG capsule, Take 1 capsule by mouth Daily., Disp: 90 capsule, Rfl: 3  •  potassium chloride 10 MEQ CR tablet, Take 1 tablet by mouth Daily., Disp: 30 tablet, Rfl: 3   Allergies   Allergen Reactions   • Ppd [Tuberculin Purified Protein Derivative] Rash      Past Surgical History:   Procedure Laterality Date   • ACHILLES TENDON REPAIR     • REDUCTION MAMMAPLASTY     • TONSILLECTOMY     • US GUIDED FINE NEEDLE ASPIRATION  2021      Social History     Socioeconomic History   • Marital status:      Spouse name: Not on file   • Number of children: Not on file   • Years of education: Not on file   • Highest education level: Not on file   Tobacco Use   • Smoking status: Never Smoker   • Smokeless tobacco: Never Used   Vaping  "Use   • Vaping Use: Never used   Substance and Sexual Activity   • Alcohol use: Yes     Alcohol/week: 2.0 standard drinks     Types: 2 Glasses of wine per week   • Drug use: No   • Sexual activity: Yes     Birth control/protection: None      Family History   Problem Relation Age of Onset   • Thyroid disease Mother    • Hyperlipidemia Mother    • Breast cancer Maternal Aunt    • Cervical cancer Maternal Aunt    • Hypertension Father    • Hyperlipidemia Father    • Hyperlipidemia Maternal Grandmother    • Ovarian cancer Maternal Grandmother    • Rheum arthritis Other    • Cancer Other         colon ovarian and breast       Review of Systems   Constitutional: Negative for chills, diaphoresis and fever.   Gastrointestinal: Negative.    Genitourinary: Negative for difficulty urinating, dysuria, menstrual problem, pelvic pain, vaginal bleeding and vaginal discharge.           Objective   /78   Ht 167.6 cm (66\")   Wt 95.9 kg (211 lb 6.4 oz)   LMP 04/06/2021 (Exact Date)   Breastfeeding No   BMI 34.12 kg/m²     Physical Exam  Constitutional:       Appearance: Normal appearance. She is well-developed and well-groomed.   Eyes:      General: Lids are normal.      Extraocular Movements: Extraocular movements intact.      Conjunctiva/sclera: Conjunctivae normal.   Chest:      Breasts: Breasts are symmetrical.         Right: No inverted nipple, mass, nipple discharge, skin change or tenderness.         Left: No inverted nipple, mass, nipple discharge, skin change or tenderness.   Abdominal:      Palpations: Abdomen is soft.      Tenderness: There is no abdominal tenderness.   Genitourinary:     Labia:         Right: No rash, tenderness or lesion.         Left: No rash, tenderness or lesion.       Urethra: No prolapse, urethral pain, urethral swelling or urethral lesion.      Vagina: No vaginal discharge, tenderness or lesions.      Cervix: No cervical motion tenderness, discharge or friability.      Uterus: Not " enlarged and not tender.       Adnexa:         Right: No mass or tenderness.          Left: No mass or tenderness.     Skin:     General: Skin is warm and dry.      Findings: No lesion.   Neurological:      General: No focal deficit present.      Mental Status: She is alert and oriented to person, place, and time.   Psychiatric:         Attention and Perception: Attention normal.         Mood and Affect: Mood normal.         Speech: Speech normal.         Behavior: Behavior is cooperative.            Result Review :                   Assessment and Plan  Diagnoses and all orders for this visit:    1. Encounter for gynecological examination without abnormal finding (Primary)    2. Screening for cervical cancer  -     Pap IG, Rfx HPV ASCU; Future      Patient Instructions   Encourage self breast exam monthly  Annual screening mammograms  Regular exercise             This note was electronically signed.    Kristina Ford PA-C   April 13, 2021

## 2021-04-19 DIAGNOSIS — Z12.4 SCREENING FOR CERVICAL CANCER: ICD-10-CM

## 2021-05-14 ENCOUNTER — OFFICE VISIT (OUTPATIENT)
Dept: OBSTETRICS AND GYNECOLOGY | Facility: CLINIC | Age: 46
End: 2021-05-14

## 2021-05-14 VITALS
BODY MASS INDEX: 33.68 KG/M2 | HEIGHT: 66 IN | DIASTOLIC BLOOD PRESSURE: 66 MMHG | SYSTOLIC BLOOD PRESSURE: 118 MMHG | WEIGHT: 209.6 LBS

## 2021-05-14 DIAGNOSIS — R87.615 UNSATISFACTORY CERVICAL PAPANICOLAOU SMEAR: Primary | ICD-10-CM

## 2021-05-14 PROCEDURE — 99212 OFFICE O/P EST SF 10 MIN: CPT | Performed by: PHYSICIAN ASSISTANT

## 2021-05-14 NOTE — PROGRESS NOTES
Subjective   Chief Complaint   Patient presents with   • Follow-up     Repeat pap.  Pap that was done on 21 returned non-diagnostic       Jesica Green is a 46 y.o. year old  presenting to be seen for repeat pap  Recent annual pap returned unsatisfactory due to insufficient squamous cells  No complaints today  LMP 21    Past Medical History:   Diagnosis Date   • Acid reflux    • Anemia    • Asthma    • History of Papanicolaou smear of cervix    • Lupus (CMS/HCC)    • MCTD (mixed connective tissue disease) (CMS/[facility])    • Positive PPD         Current Outpatient Medications:   •  diazePAM (VALIUM) 10 MG tablet, Take 1 tablet by mouth At Night As Needed for Anxiety or Muscle Spasms (insomnia)., Disp: 30 tablet, Rfl: 2  •  furosemide (Lasix) 20 MG tablet, Take 1 tablet by mouth Every Morning., Disp: 30 tablet, Rfl: 3  •  hydroxychloroquine (PLAQUENIL) 200 MG tablet, Take 200 mg by mouth 2 (Two) Times a Day., Disp: , Rfl:   •  omeprazole (priLOSEC) 40 MG capsule, Take 1 capsule by mouth Daily., Disp: 90 capsule, Rfl: 3  •  potassium chloride 10 MEQ CR tablet, Take 1 tablet by mouth Daily., Disp: 30 tablet, Rfl: 3   Allergies   Allergen Reactions   • Ppd [Tuberculin Purified Protein Derivative] Rash      Past Surgical History:   Procedure Laterality Date   • ACHILLES TENDON REPAIR     • REDUCTION MAMMAPLASTY     • TONSILLECTOMY     • US GUIDED FINE NEEDLE ASPIRATION  2021      Social History     Socioeconomic History   • Marital status:      Spouse name: Not on file   • Number of children: Not on file   • Years of education: Not on file   • Highest education level: Not on file   Tobacco Use   • Smoking status: Never Smoker   • Smokeless tobacco: Never Used   Vaping Use   • Vaping Use: Never used   Substance and Sexual Activity   • Alcohol use: Yes     Alcohol/week: 2.0 standard drinks     Types: 2 Glasses of wine per week   • Drug use: No   • Sexual activity: Yes     Birth  "control/protection: None      Family History   Problem Relation Age of Onset   • Thyroid disease Mother    • Hyperlipidemia Mother    • Breast cancer Maternal Aunt    • Cervical cancer Maternal Aunt    • Hypertension Father    • Hyperlipidemia Father    • Hyperlipidemia Maternal Grandmother    • Ovarian cancer Maternal Grandmother    • Rheum arthritis Other    • Cancer Other         colon ovarian and breast       Review of Systems   Constitutional: Negative for chills, diaphoresis and fever.   Gastrointestinal: Negative.    Genitourinary: Negative.            Objective   /66   Ht 167.6 cm (66\")   Wt 95.1 kg (209 lb 9.6 oz)   LMP 04/30/2021 (Exact Date)   Breastfeeding No   BMI 33.83 kg/m²     Physical Exam  Constitutional:       Appearance: Normal appearance. She is well-developed and well-groomed.   Eyes:      General: Lids are normal.      Extraocular Movements: Extraocular movements intact.      Conjunctiva/sclera: Conjunctivae normal.   Genitourinary:     Labia:         Right: No rash, tenderness or lesion.         Left: No rash or tenderness.       Urethra: No prolapse, urethral pain, urethral swelling or urethral lesion.      Vagina: No vaginal discharge, tenderness or lesions.      Cervix: No cervical motion tenderness, discharge, friability or lesion.            Comments: Protrusion of cervical tissue from previous cervical laceration/OB injury  Neurological:      Mental Status: She is alert.   Psychiatric:         Attention and Perception: Attention normal.         Mood and Affect: Mood normal.         Speech: Speech normal.         Behavior: Behavior is cooperative.            Result Review :                   Assessment and Plan  Diagnoses and all orders for this visit:    1. Unsatisfactory cervical Papanicolaou smear (Primary)  -     Pap IG, Rfx HPV ASCU; Future      There are no Patient Instructions on file for this visit.           This note was electronically signed.    Kristina Ford, " HEIDE   May 14, 2021

## 2021-05-25 DIAGNOSIS — R87.615 UNSATISFACTORY CERVICAL PAPANICOLAOU SMEAR: ICD-10-CM

## 2021-05-26 ENCOUNTER — TELEPHONE (OUTPATIENT)
Dept: SURGERY | Facility: CLINIC | Age: 46
End: 2021-05-26

## 2021-05-26 NOTE — TELEPHONE ENCOUNTER
Patient does not need to reschedule at this time, due to being referred to a rheumatologist and is waiting to hear what they say.

## 2021-06-02 ENCOUNTER — TRANSCRIBE ORDERS (OUTPATIENT)
Dept: ADMINISTRATIVE | Facility: HOSPITAL | Age: 46
End: 2021-06-02

## 2021-06-02 DIAGNOSIS — R06.02 SHORTNESS OF BREATH: Primary | ICD-10-CM

## 2021-06-02 DIAGNOSIS — R13.10 DYSPHAGIA, UNSPECIFIED TYPE: ICD-10-CM

## 2021-06-02 DIAGNOSIS — R53.83 OTHER FATIGUE: ICD-10-CM

## 2021-06-14 ENCOUNTER — OFFICE VISIT (OUTPATIENT)
Dept: FAMILY MEDICINE CLINIC | Facility: CLINIC | Age: 46
End: 2021-06-14

## 2021-06-14 VITALS
HEIGHT: 66 IN | WEIGHT: 208 LBS | DIASTOLIC BLOOD PRESSURE: 70 MMHG | SYSTOLIC BLOOD PRESSURE: 110 MMHG | HEART RATE: 65 BPM | BODY MASS INDEX: 33.43 KG/M2 | TEMPERATURE: 98.2 F | OXYGEN SATURATION: 98 %

## 2021-06-14 DIAGNOSIS — R73.01 IMPAIRED FASTING GLUCOSE: Primary | ICD-10-CM

## 2021-06-14 DIAGNOSIS — G47.01 INSOMNIA DUE TO MEDICAL CONDITION: ICD-10-CM

## 2021-06-14 DIAGNOSIS — R60.1 GENERALIZED EDEMA: ICD-10-CM

## 2021-06-14 DIAGNOSIS — K21.9 GASTROESOPHAGEAL REFLUX DISEASE WITHOUT ESOPHAGITIS: ICD-10-CM

## 2021-06-14 DIAGNOSIS — M35.1 MIXED CONNECTIVE TISSUE DISEASE (HCC): ICD-10-CM

## 2021-06-14 PROCEDURE — 99214 OFFICE O/P EST MOD 30 MIN: CPT | Performed by: FAMILY MEDICINE

## 2021-06-14 RX ORDER — METFORMIN HYDROCHLORIDE 500 MG/1
500 TABLET, EXTENDED RELEASE ORAL
Qty: 90 TABLET | Refills: 1 | Status: SHIPPED | OUTPATIENT
Start: 2021-06-14 | End: 2021-10-20 | Stop reason: SDUPTHER

## 2021-06-14 RX ORDER — DIAZEPAM 10 MG/1
10 TABLET ORAL NIGHTLY PRN
Qty: 30 TABLET | Refills: 2 | Status: SHIPPED | OUTPATIENT
Start: 2021-06-14 | End: 2021-10-20 | Stop reason: SDUPTHER

## 2021-06-14 RX ORDER — AMITRIPTYLINE HYDROCHLORIDE 25 MG/1
25 TABLET, FILM COATED ORAL
COMMUNITY
Start: 2021-06-01 | End: 2021-09-14

## 2021-06-14 RX ORDER — OMEPRAZOLE 40 MG/1
40 CAPSULE, DELAYED RELEASE ORAL DAILY
Qty: 90 CAPSULE | Refills: 3 | Status: SHIPPED | OUTPATIENT
Start: 2021-06-14 | End: 2021-10-20 | Stop reason: SDUPTHER

## 2021-06-14 RX ORDER — FUROSEMIDE 20 MG/1
20 TABLET ORAL EVERY MORNING
Qty: 30 TABLET | Refills: 3 | Status: SHIPPED | OUTPATIENT
Start: 2021-06-14 | End: 2021-08-26

## 2021-06-14 RX ORDER — POTASSIUM CHLORIDE 750 MG/1
10 TABLET, FILM COATED, EXTENDED RELEASE ORAL DAILY
Qty: 30 TABLET | Refills: 3 | Status: SHIPPED | OUTPATIENT
Start: 2021-06-14 | End: 2021-08-26

## 2021-06-14 NOTE — PROGRESS NOTES
Established Patient        Chief Complaint:   Chief Complaint   Patient presents with   • Follow-up     med refills; would like to discuss new medication prescribed by Rheumatology. Elavil.  Patient has not started this medication yet        Jesica Green is a 46 y.o. female    History of Present Illness:   Here for scheduled follow-up visit concerning her insomnia, generalized edema, GERD and recent history of impaired fasting glucose.    Patient has a confirmed diagnosis of mixed connective tissue disease as verified by rheumatology consultation.  They have recommended beginning treatment with amitriptyline at night.     Patient denies any fever, chills or night sweats.  Continues to diurese well.    Subjective     The following portions of the patient's history were reviewed and updated as appropriate: allergies, current medications, past family history, past medical history, past social history, past surgical history and problem list.    Allergies   Allergen Reactions   • Ppd [Tuberculin Purified Protein Derivative] Rash       Review of Systems  1. Constitutional: Negative for fever. Negative for chills, diaphoresis.  Malaise/fatigue as per above, and unexpected weight change.   2. HENT: No dysphagia; no changes to vision/hearing/smell/taste; no epistaxis  3. Eyes: Negative for redness and visual disturbance.   4. Respiratory: negative for shortness of breath. Negative for chest pain . Negative for cough and chest tightness.   5. Cardiovascular: Negative for chest pain and palpitations.   6. Gastrointestinal: As per above.  7. Endocrine: Negative for cold intolerance and heat intolerance.   8. Genitourinary: Negative for difficulty urinating, dysuria and frequency.   9. Musculoskeletal: Chronic arthralgias, back pain and myalgias.  Morning stiffness as per above.  10. Skin: Negative for color change, rash and wound.   11. Neurological: Negative for syncope, weakness and headaches.   12. Hematological:  "Negative for adenopathy. Does not bruise/bleed easily.   13. Psychiatric/Behavioral: Negative for confusion. The patient is not nervous/anxious.  Sleep difficulties as per above.    Objective     Physical Exam   Vital Signs: /70   Pulse 65   Temp 98.2 °F (36.8 °C)   Ht 167.6 cm (66\")   Wt 94.3 kg (208 lb)   SpO2 98%   BMI 33.57 kg/m²     General Appearance: alert, oriented x 3, no acute distress.  Skin: warm and dry.   HEENT: Atraumatic.  pupils round and reactive to light and accommodation, oral mucosa dry, tacky; noted nodular changes to the gingiva.  Nares patent without epistaxis.  External auditory canals are patent tympanic membranes intact.  Neck: supple, no JVD, trachea midline.  No thyromegaly.  Nodular change noted to the thyroid.  Lungs: CTA, unlabored breathing effort.  Heart: RRR, normal S1 and S2, no S3, no rub.  Abdomen: soft, non-tender, no palpable bladder, present bowel sounds to auscultation ×4.  No guarding or rigidity.  No CVA tenderness.  Extremities: no clubbing, cyanosis.  Good range of motion actively and passively.  Symmetric muscle strength and development.  Mild synovitis noted on palpation to interphalangeal joints of bilateral hands.  Symmetric  strength bilaterally.  Normal supination/pronation of bilateral forearms, as well as extension/flexion of bilateral elbows.  Normal deltoid tone and development.  No leg length discrepancies.  Nonpitting edematous changes that extends to the knees bilaterally.  Neuro: normal speech and mental status.  Cranial nerves II through XII intact.  No anosmia. DTR 2+; proprioception intact.  No focal motor/sensory deficits.    Assessment and Plan      Assessment:   Diagnoses and all orders for this visit:    1. Impaired fasting glucose (Primary)  -     metFORMIN ER (GLUCOPHAGE-XR) 500 MG 24 hr tablet; Take 1 tablet by mouth Daily With Breakfast.  Dispense: 90 tablet; Refill: 1    2. Insomnia due to medical condition  -     diazePAM " (VALIUM) 10 MG tablet; Take 1 tablet by mouth At Night As Needed for Anxiety or Muscle Spasms (insomnia).  Dispense: 30 tablet; Refill: 2    3. Generalized edema  -     furosemide (Lasix) 20 MG tablet; Take 1 tablet by mouth Every Morning.  Dispense: 30 tablet; Refill: 3  -     potassium chloride 10 MEQ CR tablet; Take 1 tablet by mouth Daily.  Dispense: 30 tablet; Refill: 3    4. Gastroesophageal reflux disease without esophagitis  -     omeprazole (priLOSEC) 40 MG capsule; Take 1 capsule by mouth Daily.  Dispense: 90 capsule; Refill: 3        Plan:  Metformin once daily with meal.  Continue dietary modifications to aid in overall healthier weight.  This should aid in improved blood glucose regulation.  I have stressed the importance of maintaining appropriate hydration status additionally.    Continue PPI therapy, continue dietary/lifestyle modifications to aid in symptom management.    Keep scheduled follow-up appointment with rheumatology, patient is planning to start amitriptyline as prescribed.  She will utilize at night due to the potential for drowsiness in the immediate hours after taking.    Continue Lasix.  Continue low-sodium/salt dietary intake.    Vital signs demonstrate hemodynamic stability.  Discussion Summary:    Discussed plan of care in detail with pt today; pt verb understanding and agrees.    I spent 30 minutes caring for Jesica on this date of service. This time includes time spent by me in the following activities:preparing for the visit, performing a medically appropriate examination and/or evaluation , counseling and educating the patient/family/caregiver, ordering medications, tests, or procedures, documenting information in the medical record and care coordination.    I have reviewed and updated all copied forward information, as appropriate.  I attest to the accuracy and relevance of any unchanged information.    Follow up:  No follow-ups on file.     There are no Patient Instructions on  file for this visit.    Sarthak Patel,   06/14/21  15:44 EDT          Please note that portions of this note may have been completed with a voice recognition program. Efforts were made to edit the dictations, but occasionally words are mistranscribed.

## 2021-06-25 ENCOUNTER — LAB (OUTPATIENT)
Dept: LAB | Facility: HOSPITAL | Age: 46
End: 2021-06-25

## 2021-06-25 DIAGNOSIS — Z01.818 PRE-OP TESTING: Primary | ICD-10-CM

## 2021-06-25 PROBLEM — M35.1 MIXED CONNECTIVE TISSUE DISEASE (HCC): Status: ACTIVE | Noted: 2021-06-25

## 2021-06-25 LAB — SARS-COV-2 RNA NOSE QL NAA+PROBE: NOT DETECTED

## 2021-06-25 PROCEDURE — C9803 HOPD COVID-19 SPEC COLLECT: HCPCS

## 2021-06-25 PROCEDURE — U0004 COV-19 TEST NON-CDC HGH THRU: HCPCS

## 2021-06-28 ENCOUNTER — APPOINTMENT (OUTPATIENT)
Dept: GENERAL RADIOLOGY | Facility: HOSPITAL | Age: 46
End: 2021-06-28

## 2021-06-28 ENCOUNTER — HOSPITAL ENCOUNTER (OUTPATIENT)
Dept: CARDIOLOGY | Facility: HOSPITAL | Age: 46
Discharge: HOME | End: 2021-06-28

## 2021-06-28 ENCOUNTER — HOSPITAL ENCOUNTER (OUTPATIENT)
Dept: PULMONOLOGY | Facility: HOSPITAL | Age: 46
Discharge: HOME | End: 2021-06-28

## 2021-06-28 DIAGNOSIS — R53.83 OTHER FATIGUE: ICD-10-CM

## 2021-06-28 DIAGNOSIS — R06.02 SHORTNESS OF BREATH: ICD-10-CM

## 2021-06-28 LAB
BH CV ECHO MEAS - % IVS THICK: 61.5 %
BH CV ECHO MEAS - % LVPW THICK: 52.2 %
BH CV ECHO MEAS - AO MAX PG (FULL): 2.8 MMHG
BH CV ECHO MEAS - AO MAX PG: 6 MMHG
BH CV ECHO MEAS - AO MEAN PG (FULL): 2 MMHG
BH CV ECHO MEAS - AO MEAN PG: 3 MMHG
BH CV ECHO MEAS - AO ROOT AREA (BSA CORRECTED): 1.5
BH CV ECHO MEAS - AO ROOT AREA: 7.3 CM^2
BH CV ECHO MEAS - AO ROOT DIAM: 3.1 CM
BH CV ECHO MEAS - AO V2 MAX: 123 CM/SEC
BH CV ECHO MEAS - AO V2 MEAN: 81.5 CM/SEC
BH CV ECHO MEAS - AO V2 VTI: 24.8 CM
BH CV ECHO MEAS - ASC AORTA: 3.3 CM
BH CV ECHO MEAS - AVA(I,A): 3 CM^2
BH CV ECHO MEAS - AVA(I,D): 3 CM^2
BH CV ECHO MEAS - AVA(V,A): 3 CM^2
BH CV ECHO MEAS - AVA(V,D): 3 CM^2
BH CV ECHO MEAS - BSA(HAYCOCK): 2.1 M^2
BH CV ECHO MEAS - BSA: 2 M^2
BH CV ECHO MEAS - BZI_BMI: 33.6 KILOGRAMS/M^2
BH CV ECHO MEAS - BZI_METRIC_HEIGHT: 167.6 CM
BH CV ECHO MEAS - BZI_METRIC_WEIGHT: 94.3 KG
BH CV ECHO MEAS - EDV(CUBED): 116.2 ML
BH CV ECHO MEAS - EDV(MOD-SP2): 102 ML
BH CV ECHO MEAS - EDV(MOD-SP4): 108 ML
BH CV ECHO MEAS - EDV(TEICH): 111.7 ML
BH CV ECHO MEAS - EF(CUBED): 68.2 %
BH CV ECHO MEAS - EF(MOD-BP): 58.7 %
BH CV ECHO MEAS - EF(MOD-SP2): 52.5 %
BH CV ECHO MEAS - EF(MOD-SP4): 64.9 %
BH CV ECHO MEAS - EF(TEICH): 59.6 %
BH CV ECHO MEAS - ESV(CUBED): 36.9 ML
BH CV ECHO MEAS - ESV(MOD-SP2): 48.5 ML
BH CV ECHO MEAS - ESV(MOD-SP4): 37.9 ML
BH CV ECHO MEAS - ESV(TEICH): 45.1 ML
BH CV ECHO MEAS - FS: 31.8 %
BH CV ECHO MEAS - IVS/LVPW: 0.87
BH CV ECHO MEAS - IVSD: 0.78 CM
BH CV ECHO MEAS - IVSS: 1.3 CM
BH CV ECHO MEAS - LA DIMENSION: 3 CM
BH CV ECHO MEAS - LA/AO: 0.98
BH CV ECHO MEAS - LAD MAJOR: 5.1 CM
BH CV ECHO MEAS - LAT PEAK E' VEL: 19.5 CM/SEC
BH CV ECHO MEAS - LATERAL E/E' RATIO: 5.3
BH CV ECHO MEAS - LV DIASTOLIC VOL/BSA (35-75): 53.1 ML/M^2
BH CV ECHO MEAS - LV MASS(C)D: 138.8 GRAMS
BH CV ECHO MEAS - LV MASS(C)DI: 68.2 GRAMS/M^2
BH CV ECHO MEAS - LV MASS(C)S: 146 GRAMS
BH CV ECHO MEAS - LV MASS(C)SI: 71.8 GRAMS/M^2
BH CV ECHO MEAS - LV MAX PG: 3.2 MMHG
BH CV ECHO MEAS - LV MEAN PG: 1 MMHG
BH CV ECHO MEAS - LV SYSTOLIC VOL/BSA (12-30): 18.6 ML/M^2
BH CV ECHO MEAS - LV V1 MAX: 89.5 CM/SEC
BH CV ECHO MEAS - LV V1 MEAN: 53.5 CM/SEC
BH CV ECHO MEAS - LV V1 VTI: 18.3 CM
BH CV ECHO MEAS - LVIDD: 4.9 CM
BH CV ECHO MEAS - LVIDS: 3.3 CM
BH CV ECHO MEAS - LVLD AP2: 8.4 CM
BH CV ECHO MEAS - LVLD AP4: 8.3 CM
BH CV ECHO MEAS - LVLS AP2: 6.7 CM
BH CV ECHO MEAS - LVLS AP4: 6.4 CM
BH CV ECHO MEAS - LVOT AREA (M): 4.1 CM^2
BH CV ECHO MEAS - LVOT AREA: 4.1 CM^2
BH CV ECHO MEAS - LVOT DIAM: 2.3 CM
BH CV ECHO MEAS - LVPWD: 0.9 CM
BH CV ECHO MEAS - LVPWS: 1.4 CM
BH CV ECHO MEAS - MED PEAK E' VEL: 16.3 CM/SEC
BH CV ECHO MEAS - MEDIAL E/E' RATIO: 6.3
BH CV ECHO MEAS - MV A MAX VEL: 71 CM/SEC
BH CV ECHO MEAS - MV DEC TIME: 0.16 SEC
BH CV ECHO MEAS - MV E MAX VEL: 103 CM/SEC
BH CV ECHO MEAS - MV E/A: 1.5
BH CV ECHO MEAS - MV MAX PG: 4.1 MMHG
BH CV ECHO MEAS - MV MEAN PG: 2 MMHG
BH CV ECHO MEAS - MV V2 MAX: 101 CM/SEC
BH CV ECHO MEAS - MV V2 MEAN: 65.5 CM/SEC
BH CV ECHO MEAS - MV V2 VTI: 22.7 CM
BH CV ECHO MEAS - MVA(VTI): 3.3 CM^2
BH CV ECHO MEAS - PA ACC TIME: 0.19 SEC
BH CV ECHO MEAS - PA MAX PG (FULL): 2.1 MMHG
BH CV ECHO MEAS - PA MAX PG: 4.5 MMHG
BH CV ECHO MEAS - PA MEAN PG (FULL): 1 MMHG
BH CV ECHO MEAS - PA MEAN PG: 2 MMHG
BH CV ECHO MEAS - PA PR(ACCEL): -8.3 MMHG
BH CV ECHO MEAS - PA V2 MAX: 106 CM/SEC
BH CV ECHO MEAS - PA V2 MEAN: 68.2 CM/SEC
BH CV ECHO MEAS - PA V2 VTI: 21.8 CM
BH CV ECHO MEAS - PULM DIAS VEL: 43.6 CM/SEC
BH CV ECHO MEAS - PULM S/D: 1
BH CV ECHO MEAS - PULM SYS VEL: 43.6 CM/SEC
BH CV ECHO MEAS - RAP SYSTOLE: 8 MMHG
BH CV ECHO MEAS - RV MAX PG: 2.4 MMHG
BH CV ECHO MEAS - RV MEAN PG: 1 MMHG
BH CV ECHO MEAS - RV V1 MAX: 77.6 CM/SEC
BH CV ECHO MEAS - RV V1 MEAN: 47.3 CM/SEC
BH CV ECHO MEAS - RV V1 VTI: 17.6 CM
BH CV ECHO MEAS - SI(AO): 89.1 ML/M^2
BH CV ECHO MEAS - SI(CUBED): 39 ML/M^2
BH CV ECHO MEAS - SI(LVOT): 36.7 ML/M^2
BH CV ECHO MEAS - SI(MOD-SP2): 26.3 ML/M^2
BH CV ECHO MEAS - SI(MOD-SP4): 34.5 ML/M^2
BH CV ECHO MEAS - SI(TEICH): 32.8 ML/M^2
BH CV ECHO MEAS - SV(AO): 181.2 ML
BH CV ECHO MEAS - SV(CUBED): 79.3 ML
BH CV ECHO MEAS - SV(LVOT): 74.7 ML
BH CV ECHO MEAS - SV(MOD-SP2): 53.5 ML
BH CV ECHO MEAS - SV(MOD-SP4): 70.1 ML
BH CV ECHO MEAS - SV(TEICH): 66.6 ML
BH CV ECHO MEAS - TAPSE (>1.6): 2.3 CM
BH CV ECHO MEASUREMENTS AVERAGE E/E' RATIO: 5.75
BH CV XLRA - RV BASE: 4.2 CM
BH CV XLRA - RV LENGTH: 7.1 CM
BH CV XLRA - RV MID: 3.4 CM
BH CV XLRA - TDI S': 11.8 CM/SEC
LEFT ATRIUM VOLUME INDEX: 28.5 ML/M^2
LEFT ATRIUM VOLUME: 58 ML
MAXIMAL PREDICTED HEART RATE: 176 BPM
STRESS TARGET HR: 150 BPM

## 2021-06-28 PROCEDURE — 94010 BREATHING CAPACITY TEST: CPT | Performed by: INTERNAL MEDICINE

## 2021-06-28 PROCEDURE — 93306 TTE W/DOPPLER COMPLETE: CPT | Performed by: INTERNAL MEDICINE

## 2021-06-28 PROCEDURE — 93306 TTE W/DOPPLER COMPLETE: CPT

## 2021-06-28 PROCEDURE — 94010 BREATHING CAPACITY TEST: CPT

## 2021-06-28 PROCEDURE — 94726 PLETHYSMOGRAPHY LUNG VOLUMES: CPT | Performed by: INTERNAL MEDICINE

## 2021-06-28 PROCEDURE — 94729 DIFFUSING CAPACITY: CPT | Performed by: INTERNAL MEDICINE

## 2021-06-28 PROCEDURE — 94729 DIFFUSING CAPACITY: CPT

## 2021-06-28 PROCEDURE — 94726 PLETHYSMOGRAPHY LUNG VOLUMES: CPT

## 2021-07-05 ENCOUNTER — TRANSCRIBE ORDERS (OUTPATIENT)
Dept: LAB | Facility: HOSPITAL | Age: 46
End: 2021-07-05

## 2021-07-05 ENCOUNTER — LAB (OUTPATIENT)
Dept: LAB | Facility: HOSPITAL | Age: 46
End: 2021-07-05

## 2021-07-05 DIAGNOSIS — Z01.818 PRE-OPERATIVE CLEARANCE: ICD-10-CM

## 2021-07-05 DIAGNOSIS — Z01.818 PRE-OPERATIVE CLEARANCE: Primary | ICD-10-CM

## 2021-07-05 PROCEDURE — C9803 HOPD COVID-19 SPEC COLLECT: HCPCS

## 2021-07-05 PROCEDURE — U0004 COV-19 TEST NON-CDC HGH THRU: HCPCS

## 2021-07-06 LAB — SARS-COV-2 RNA NOSE QL NAA+PROBE: NOT DETECTED

## 2021-07-08 ENCOUNTER — HOSPITAL ENCOUNTER (OUTPATIENT)
Dept: GENERAL RADIOLOGY | Facility: HOSPITAL | Age: 46
Discharge: HOME | End: 2021-07-08
Admitting: INTERNAL MEDICINE

## 2021-07-08 DIAGNOSIS — R13.10 DYSPHAGIA, UNSPECIFIED TYPE: ICD-10-CM

## 2021-07-08 PROCEDURE — 92611 MOTION FLUOROSCOPY/SWALLOW: CPT

## 2021-07-08 PROCEDURE — 74230 X-RAY XM SWLNG FUNCJ C+: CPT

## 2021-07-08 NOTE — MBS/VFSS/FEES
Outpatient - Speech Language Pathology   Swallow Modified Barium Swallow Study  [place]     Patient Name: Jesica Green  : 1975  MRN: [id number]  Today's Date: 2021               Admit Date: 2021    Visit Dx:     ICD-10-CM ICD-9-CM   1. Dysphagia, unspecified type  R13.10 787.20     Patient Active Problem List   Diagnosis   • Malaise and fatigue   • Polyarthralgia   • Constipation   • Gastroesophageal reflux disease without esophagitis   • Family history of diabetes mellitus   • Family history of thyroid disease in mother   • Arthralgia of hands, bilateral   • Chronic lumbar pain   • Morning joint stiffness of multiple sites   • Telogen effluvium   • Alopecia   • FHx: Charcot-Edita-Tooth disease   • Iron deficiency   • Vitamin D deficiency   • Need for vaccination   • Esophageal dysphagia   • Insomnia due to medical condition   • Restless legs syndrome   • Mixed connective tissue disease (CMS/HCC)     Past Medical History:   Diagnosis Date   • Acid reflux    • Anemia    • Asthma    • History of Papanicolaou smear of cervix    • Lupus (CMS/HCC)    • MCTD (mixed connective tissue disease) (CMS/HCC)    • Positive PPD      Past Surgical History:   Procedure Laterality Date   • ACHILLES TENDON REPAIR     • REDUCTION MAMMAPLASTY     • TONSILLECTOMY     • US GUIDED FINE NEEDLE ASPIRATION  2021        SWALLOW EVALUATION (last 72 hours)      SLP Adult Swallow Evaluation     Row Name 21 0945                   Rehab Evaluation    Document Type  other (see comments) MBSS  -TM        Subjective Information  complains of difficulty swallowing  -TM        Patient Observations  alert;cooperative  -TM        Patient/Family/Caregiver Comments/Observations  no family present  -TM        Patient Effort  excellent  -TM           General Information    Patient Profile Reviewed  yes  -TM        Pertinent History Of Current Problem  GERD  -TM        Current Method of Nutrition  regular textures;thin  liquids  -TM        Precautions/Limitations, Vision  WFL  -TM        Precautions/Limitations, Hearing  WFL  -TM        Prior Level of Function-Communication  WFL  -TM        Prior Level of Function-Swallowing  esophageal concerns;no diet consistency restrictions  -TM        Plans/Goals Discussed with  patient  -TM        Barriers to Rehab  none identified  -TM        Patient's Goals for Discharge  patient did not state  -TM           Pain    Additional Documentation  Pain Scale: Numbers Pre/Post-Treatment (Group)  -TM           Pain Scale: Numbers Pre/Post-Treatment    Pretreatment Pain Rating  0/10 - no pain  -TM        Posttreatment Pain Rating  0/10 - no pain  -TM           Oral Motor Structure and Function    Oral Lesions or Structural Abnormalities and/or variants  none identified  -TM        Dentition Assessment  natural, present and adequate  -TM        Secretion Management  WNL/WFL  -TM        Mucosal Quality  moist, healthy  -TM        Volitional Cough  WFL  -TM           Oral Musculature and Cranial Nerve Assessment    Oral Motor General Assessment  WFL  -TM           Respiratory    Respiratory Status  WFL;room air  -TM           MBS/VFSS    Utensils Used  spoon;cup;straw  -TM        Consistencies Trialed  regular textures;mechanical soft, no mixed consistencies;pudding thick;honey-thick liquids;nectar/syrup-thick liquids;thin liquids  -TM           MBS/VFSS Interpretation    Oral Prep Phase  WFL  -TM        Oral Transit Phase  WFL  -TM        Oral Residue  WFL  -TM        VFSS Summary  MBS completed with pt. standing for exam.  Pt. voiced complaints of some difficulty swallowing with occasional strangling.  Oral mech was WFL without appreciable deficits.  Pt. was given trials of regular and mechanical soft solids, pudding-thick, honey-thick, nectar-thick, and thin liquids.  Oral phase was WFL with all consistencies.  Regarding pharyngeal phase, WFL with functiona and phayrngeal strength with no laryngeal  penetration or aspiration with any trial.  Pt. does have dx of GERD which may reproduce symptoms per pt. complaint if not well managed.  Pt. may benefit from further esophageal assessment as MD wishes to determine if third stage dysphagia is causative of her complaints.  Recommend:  1. continue reg diet with thin liq as jimi,  2. meds whole with thin liq as jimi,  3. aspiration precautions,  4. reflux precautions,  5. MD may wish to consider further esophageal assessment if pt. complaints persist.   -TM           Initiation of Pharyngeal Swallow    Initiation of Pharyngeal Swallow  WFL  -TM        Pharyngeal Phase  functional pharyngeal phase of swallowing  -TM           Esophageal Phase    Esophageal Phase  other (see comments) dx of GERD  -TM           SLP Communication to Radiology    Severity Level of Dysphagia  WFL;suspected esophageal dysfunction  -TM        Summary Statement  oropharyngeal phases of swallow WFL; third stage dysphagia   -TM           Clinical Impression    Barriers to Overall Progress (SLP)  none identified  -        SLP Swallowing Diagnosis  functional oral phase;functional pharyngeal phase;esophageal dysphagia  -TM        Swallow Criteria for Skilled Therapeutic Interventions Met  no problems identified which require skilled intervention  -        Plan for Continued Treatment (SLP)  none needed at this time  -           Recommendations    Therapy Frequency (Swallow)  evaluation only  -        SLP Diet Recommendation  regular textures;thin liquids  -        Recommended Diagnostics  No further SLP services recommended  -        Recommended Precautions and Strategies  general aspiration precautions;reflux precautions  -        Oral Care Recommendations  Toothbrush  -        SLP Rec. for Method of Medication Administration  meds whole;with thin liquids;as tolerated  -        Monitor for Signs of Aspiration  no  -TM        Anticipated Discharge Disposition (SLP)  home  -         Demonstrates Need for Referral to Another Service  dedicated esophageal assessment  -          User Key  (r) = Recorded By, (t) = Taken By, (c) = Cosigned By    Initials Name Effective Dates    TM Alyssa Lemos 06/16/21 -           EDUCATION  The patient has been educated in the following areas:   Dysphagia (Swallowing Impairment) Oral Care/Hydration.    SLP Recommendation and Plan  SLP Swallowing Diagnosis: functional oral phase, functional pharyngeal phase, esophageal dysphagia  SLP Diet Recommendation: regular textures, thin liquids  Recommended Precautions and Strategies: general aspiration precautions, reflux precautions  SLP Rec. for Method of Medication Administration: meds whole, with thin liquids, as tolerated     Monitor for Signs of Aspiration: no  Recommended Diagnostics: No further SLP services recommended  Swallow Criteria for Skilled Therapeutic Interventions Met: no problems identified which require skilled intervention  Anticipated Discharge Disposition (SLP): home     Therapy Frequency (Swallow): evaluation only     Demonstrates Need for Referral to Another Service: dedicated esophageal assessment       Plan for Continued Treatment (SLP): none needed at this time                          Time Calculation:   Time Calculation- SLP     Row Name 07/08/21 1043             Time Calculation- SLP    SLP Start Time  0945  -      SLP Received On  07/08/21  -         Untimed Charges    SLP Eval/Re-eval   [facility] Fluoro Eval Swallow - 12929  -        User Key  (r) = Recorded By, (t) = Taken By, (c) = Cosigned By    Initials Name Provider Type     Alyssa Lemos Speech and Language Pathologist          Therapy Charges for Today     Code Description Service Date Service Provider Modifiers Qty    [id number] HC [facility]ION FLUORO EVAL SWALLOW 6 7/8/2021 Alyssa Lemos GN 1               Alyssa Lemos  7/8/2021

## 2021-08-18 ENCOUNTER — APPOINTMENT (OUTPATIENT)
Dept: GENERAL RADIOLOGY | Facility: HOSPITAL | Age: 46
End: 2021-08-18

## 2021-08-18 ENCOUNTER — HOSPITAL ENCOUNTER (EMERGENCY)
Facility: HOSPITAL | Age: 46
Discharge: HOME | End: 2021-08-18
Attending: EMERGENCY MEDICINE | Admitting: EMERGENCY MEDICINE

## 2021-08-18 VITALS
HEIGHT: 66 IN | RESPIRATION RATE: 16 BRPM | OXYGEN SATURATION: 99 % | BODY MASS INDEX: 32.3 KG/M2 | HEART RATE: 70 BPM | TEMPERATURE: 98 F | WEIGHT: 201 LBS | SYSTOLIC BLOOD PRESSURE: 124 MMHG | DIASTOLIC BLOOD PRESSURE: 93 MMHG

## 2021-08-18 DIAGNOSIS — R07.9 RIGHT-SIDED CHEST PAIN: Primary | ICD-10-CM

## 2021-08-18 LAB
ALBUMIN SERPL-MCNC: 4.5 G/DL (ref 3.5–5.2)
ALBUMIN/GLOB SERPL: 2 G/DL
ALP SERPL-CCNC: 46 U/L (ref 39–117)
ALT SERPL W P-5'-P-CCNC: 13 U/L (ref 1–33)
ANION GAP SERPL CALCULATED.3IONS-SCNC: 10.8 MMOL/L (ref 5–15)
AST SERPL-CCNC: 15 U/L (ref 1–32)
BASOPHILS # BLD AUTO: 0.04 10*3/MM3 (ref 0–0.2)
BASOPHILS NFR BLD AUTO: 0.7 % (ref 0–1.5)
BILIRUB SERPL-MCNC: 0.3 MG/DL (ref 0–1.2)
BUN SERPL-MCNC: 18 MG/DL (ref 6–20)
BUN/CREAT SERPL: 27.7 (ref 7–25)
CALCIUM SPEC-SCNC: 9.7 MG/DL (ref 8.6–10.5)
CHLORIDE SERPL-SCNC: 102 MMOL/L (ref 98–107)
CO2 SERPL-SCNC: 23.2 MMOL/L (ref 22–29)
CREAT SERPL-MCNC: 0.65 MG/DL (ref 0.57–1)
D DIMER PPP FEU-MCNC: 0.3 MCGFEU/ML (ref 0–0.57)
DEPRECATED RDW RBC AUTO: 39.5 FL (ref 37–54)
EOSINOPHIL # BLD AUTO: 0.09 10*3/MM3 (ref 0–0.4)
EOSINOPHIL NFR BLD AUTO: 1.6 % (ref 0.3–6.2)
ERYTHROCYTE [DISTWIDTH] IN BLOOD BY AUTOMATED COUNT: 11.9 % (ref 12.3–15.4)
GFR SERPL CREATININE-BSD FRML MDRD: 99 ML/MIN/1.73
GLOBULIN UR ELPH-MCNC: 2.3 GM/DL
GLUCOSE SERPL-MCNC: 79 MG/DL (ref 65–99)
HCT VFR BLD AUTO: 38.3 % (ref 34–46.6)
HGB BLD-MCNC: 12.9 G/DL (ref 12–15.9)
HOLD SPECIMEN: NORMAL
HOLD SPECIMEN: NORMAL
IMM GRANULOCYTES # BLD AUTO: 0.01 10*3/MM3 (ref 0–0.05)
IMM GRANULOCYTES NFR BLD AUTO: 0.2 % (ref 0–0.5)
LYMPHOCYTES # BLD AUTO: 1.57 10*3/MM3 (ref 0.7–3.1)
LYMPHOCYTES NFR BLD AUTO: 27.4 % (ref 19.6–45.3)
MCH RBC QN AUTO: 30.6 PG (ref 26.6–33)
MCHC RBC AUTO-ENTMCNC: 33.7 G/DL (ref 31.5–35.7)
MCV RBC AUTO: 91 FL (ref 79–97)
MONOCYTES # BLD AUTO: 0.37 10*3/MM3 (ref 0.1–0.9)
MONOCYTES NFR BLD AUTO: 6.4 % (ref 5–12)
NEUTROPHILS NFR BLD AUTO: 3.66 10*3/MM3 (ref 1.7–7)
NEUTROPHILS NFR BLD AUTO: 63.7 % (ref 42.7–76)
NRBC BLD AUTO-RTO: 0 /100 WBC (ref 0–0.2)
PLATELET # BLD AUTO: 226 10*3/MM3 (ref 140–450)
PMV BLD AUTO: 10.8 FL (ref 6–12)
POTASSIUM SERPL-SCNC: 4 MMOL/L (ref 3.5–5.2)
PROT SERPL-MCNC: 6.8 G/DL (ref 6–8.5)
RBC # BLD AUTO: 4.21 10*6/MM3 (ref 3.77–5.28)
SODIUM SERPL-SCNC: 136 MMOL/L (ref 136–145)
TROPONIN T SERPL-MCNC: <0.01 NG/ML (ref 0–0.03)
WBC # BLD AUTO: 5.74 10*3/MM3 (ref 3.4–10.8)
WHOLE BLOOD HOLD SPECIMEN: NORMAL

## 2021-08-18 PROCEDURE — 85025 COMPLETE CBC W/AUTO DIFF WBC: CPT

## 2021-08-18 PROCEDURE — 71045 X-RAY EXAM CHEST 1 VIEW: CPT

## 2021-08-18 PROCEDURE — 25010000002 KETOROLAC TROMETHAMINE PER 15 MG: Performed by: EMERGENCY MEDICINE

## 2021-08-18 PROCEDURE — 93005 ELECTROCARDIOGRAM TRACING: CPT

## 2021-08-18 PROCEDURE — 80053 COMPREHEN METABOLIC PANEL: CPT

## 2021-08-18 PROCEDURE — 85379 FIBRIN DEGRADATION QUANT: CPT | Performed by: EMERGENCY MEDICINE

## 2021-08-18 PROCEDURE — 84484 ASSAY OF TROPONIN QUANT: CPT

## 2021-08-18 PROCEDURE — 99283 EMERGENCY DEPT VISIT LOW MDM: CPT

## 2021-08-18 PROCEDURE — 96374 THER/PROPH/DIAG INJ IV PUSH: CPT

## 2021-08-18 RX ORDER — KETOROLAC TROMETHAMINE 30 MG/ML
15 INJECTION, SOLUTION INTRAMUSCULAR; INTRAVENOUS ONCE
Status: COMPLETED | OUTPATIENT
Start: 2021-08-18 | End: 2021-08-18

## 2021-08-18 RX ORDER — ASPIRIN 325 MG
325 TABLET ORAL ONCE
Status: COMPLETED | OUTPATIENT
Start: 2021-08-18 | End: 2021-08-18

## 2021-08-18 RX ORDER — SODIUM CHLORIDE 0.9 % (FLUSH) 0.9 %
10 SYRINGE (ML) INJECTION AS NEEDED
Status: DISCONTINUED | OUTPATIENT
Start: 2021-08-18 | End: 2021-08-18 | Stop reason: HOSPADM

## 2021-08-18 RX ORDER — KETOROLAC TROMETHAMINE 10 MG/1
10 TABLET, FILM COATED ORAL EVERY 6 HOURS PRN
Qty: 20 TABLET | Refills: 0 | Status: SHIPPED | OUTPATIENT
Start: 2021-08-18 | End: 2021-09-14

## 2021-08-18 RX ADMIN — ASPIRIN 325 MG ORAL TABLET 325 MG: 325 PILL ORAL at 10:17

## 2021-08-18 RX ADMIN — KETOROLAC TROMETHAMINE 15 MG: 30 INJECTION, SOLUTION INTRAMUSCULAR; INTRAVENOUS at 10:17

## 2021-08-18 NOTE — ED PROVIDER NOTES
Subjective   46-year-old female presenting with chest pain.  She states that last night started having a tightness in her right chest.  Some sharp pains in the right axilla that radiate into the right neck.  This is worse with deep breath.  No alleviating factors.  She denies any significant shortness of breath, fevers, chills, cough, nausea, vomiting.  She does note she was recently diagnosed with lupus and mixed tissue connective disorder.  She is on Plaquenil.          Review of Systems   Constitutional: Negative.    HENT: Negative.    Eyes: Negative.    Respiratory: Positive for shortness of breath. Negative for cough.    Cardiovascular: Positive for chest pain. Negative for palpitations.   Gastrointestinal: Negative.    Genitourinary: Negative.    Musculoskeletal: Positive for arthralgias.   Skin: Negative.    Neurological: Negative.    Psychiatric/Behavioral: Negative.        Past Medical History:   Diagnosis Date   • Acid reflux    • Anemia    • Asthma    • History of Papanicolaou smear of cervix 2015   • Lupus (CMS/HCC)    • MCTD (mixed connective tissue disease) (CMS/HCC)    • Positive PPD        Allergies   Allergen Reactions   • Ppd [Tuberculin Purified Protein Derivative] Rash       Past Surgical History:   Procedure Laterality Date   • ACHILLES TENDON REPAIR     • REDUCTION MAMMAPLASTY  2000   • TONSILLECTOMY     • US GUIDED FINE NEEDLE ASPIRATION  1/5/2021       Family History   Problem Relation Age of Onset   • Thyroid disease Mother    • Hyperlipidemia Mother    • Breast cancer Maternal Aunt    • Cervical cancer Maternal Aunt    • Hypertension Father    • Hyperlipidemia Father    • Hyperlipidemia Maternal Grandmother    • Ovarian cancer Maternal Grandmother    • Rheum arthritis Other    • Cancer Other         colon ovarian and breast       Social History     Socioeconomic History   • Marital status:      Spouse name: Not on file   • Number of children: Not on file   • Years of education: Not  on file   • Highest education level: Not on file   Tobacco Use   • Smoking status: Never Smoker   • Smokeless tobacco: Never Used   Vaping Use   • Vaping Use: Never used   Substance and Sexual Activity   • Alcohol use: Yes     Alcohol/week: 2.0 standard drinks     Types: 2 Glasses of wine per week   • Drug use: No   • Sexual activity: Yes     Birth control/protection: None           Objective   Physical Exam  Constitutional:       General: She is not in acute distress.     Appearance: Normal appearance. She is not ill-appearing, toxic-appearing or diaphoretic.   HENT:      Head: Normocephalic and atraumatic.      Right Ear: External ear normal.      Left Ear: External ear normal.      Nose: Nose normal.      Mouth/Throat:      Mouth: Mucous membranes are moist.      Pharynx: Oropharynx is clear.   Eyes:      Extraocular Movements: Extraocular movements intact.      Conjunctiva/sclera: Conjunctivae normal.      Pupils: Pupils are equal, round, and reactive to light.   Cardiovascular:      Rate and Rhythm: Normal rate and regular rhythm.      Pulses: Normal pulses.      Heart sounds: Normal heart sounds.   Pulmonary:      Effort: Pulmonary effort is normal. No respiratory distress.      Breath sounds: Normal breath sounds.   Abdominal:      General: Bowel sounds are normal. There is no distension.      Tenderness: There is no abdominal tenderness.   Musculoskeletal:         General: No swelling or deformity. Normal range of motion.      Cervical back: Normal range of motion.      Comments: Tenderness along the right scapula and right chest   Skin:     General: Skin is warm and dry.      Capillary Refill: Capillary refill takes less than 2 seconds.      Findings: No rash.   Neurological:      General: No focal deficit present.      Mental Status: She is alert and oriented to person, place, and time.   Psychiatric:         Mood and Affect: Mood normal.         Behavior: Behavior normal.         Procedures           ED  Course                                           MDM  Number of Diagnoses or Management Options  Right-sided chest pain  Diagnosis management comments: 46-year-old female with right-sided chest tightness.  Well-developed, well-nourished lady in no distress with exam as above.  Based on her history and exam pain seems more consistent with musculoskeletal type pain.  Will obtain labs to include D-dimer.  Will obtain chest imaging.  Will give symptomatic treatment.  Disposition pending.    DDx: Musculoskeletal pain, ACS, PE, pericarditis or myocarditis    EKG interpreted by me: Sinus rhythm, normal rate, no acute ST/T changes, some low-voltage QRS complexes, this is an atypical EKG    Work-up here is overall unremarkable.  She feels well.  I do feel she is safe for discharge home.  We discussed outpatient follow-up, supportive measures.  She is comfortable with and understand the plan.      Final diagnoses:   Right-sided chest pain        Yifan Sahni MD  08/18/21 7220

## 2021-08-26 DIAGNOSIS — R60.1 GENERALIZED EDEMA: ICD-10-CM

## 2021-08-26 RX ORDER — FUROSEMIDE 20 MG/1
20 TABLET ORAL EVERY MORNING
Qty: 90 TABLET | Refills: 0 | Status: SHIPPED | OUTPATIENT
Start: 2021-08-26 | End: 2021-11-11 | Stop reason: SDUPTHER

## 2021-08-26 RX ORDER — POTASSIUM CHLORIDE 750 MG/1
10 TABLET, FILM COATED, EXTENDED RELEASE ORAL DAILY
Qty: 90 TABLET | Refills: 0 | Status: SHIPPED | OUTPATIENT
Start: 2021-08-26 | End: 2021-11-11 | Stop reason: SDUPTHER

## 2021-08-26 RX ORDER — POTASSIUM CHLORIDE 750 MG/1
10 TABLET, FILM COATED, EXTENDED RELEASE ORAL DAILY
Qty: 30 TABLET | Refills: 3 | OUTPATIENT
Start: 2021-08-26

## 2021-08-26 RX ORDER — FUROSEMIDE 20 MG/1
20 TABLET ORAL EVERY MORNING
Qty: 30 TABLET | Refills: 3 | OUTPATIENT
Start: 2021-08-26

## 2021-09-14 ENCOUNTER — OFFICE VISIT (OUTPATIENT)
Dept: FAMILY MEDICINE CLINIC | Facility: CLINIC | Age: 46
End: 2021-09-14

## 2021-09-14 VITALS
WEIGHT: 199 LBS | SYSTOLIC BLOOD PRESSURE: 120 MMHG | BODY MASS INDEX: 31.98 KG/M2 | TEMPERATURE: 98 F | HEART RATE: 68 BPM | HEIGHT: 66 IN | OXYGEN SATURATION: 98 % | DIASTOLIC BLOOD PRESSURE: 70 MMHG

## 2021-09-14 DIAGNOSIS — K21.9 GASTROESOPHAGEAL REFLUX DISEASE WITHOUT ESOPHAGITIS: Primary | ICD-10-CM

## 2021-09-14 DIAGNOSIS — M35.1 MIXED CONNECTIVE TISSUE DISEASE (HCC): ICD-10-CM

## 2021-09-14 DIAGNOSIS — M25.50 POLYARTHRALGIA: ICD-10-CM

## 2021-09-14 PROCEDURE — 99213 OFFICE O/P EST LOW 20 MIN: CPT | Performed by: FAMILY MEDICINE

## 2021-09-14 NOTE — PROGRESS NOTES
Established Patient        Chief Complaint:   Chief Complaint   Patient presents with   • Follow-up     patient is doing well with metformin        Jesica Green is a 46 y.o. female    History of Present Illness:   Here today in scheduled follow-up visit of her GERD, mixed connective tissue disease, polyarthralgia.    Patient reports significant improvement to her overall arthralgias/myalgias with current treatment regimen.  Continues to be followed by rheumatology.    She denies any aspiration.  No BRB/BTS.  She has had numerous changes to her exercise regimen as a result of improved myalgias/arthralgias, as well as overall stamina.  This is yielded substantial weight transition to an overall healthier life.  She has made numerous dietary modifications additionally.  She remains well-hydrated.    She denies any hematuria or dysuria.  No abnormal rashes.    Subjective     The following portions of the patient's history were reviewed and updated as appropriate: allergies, current medications, past family history, past medical history, past social history, past surgical history and problem list.    Allergies   Allergen Reactions   • Ppd [Tuberculin Purified Protein Derivative] Rash       Review of Systems  1. Constitutional: Negative for fever. Negative for chills, diaphoresis.  Malaise/fatigue as per above, and unexpected weight change.   2. HENT: No dysphagia; no changes to vision/hearing/smell/taste; no epistaxis  3. Eyes: Negative for redness and visual disturbance.   4. Respiratory: negative for shortness of breath. Negative for chest pain . Negative for cough and chest tightness.   5. Cardiovascular: Negative for chest pain and palpitations.   6. Gastrointestinal: no BRB/BTS; no aspiration.  7. Endocrine: Negative for cold intolerance and heat intolerance.   8. Genitourinary: Negative for difficulty urinating, dysuria and frequency.   9. Musculoskeletal: As per above.  10. Skin: Negative for color change,  "rash and wound.   11. Neurological: Negative for syncope, weakness and headaches.   12. Hematological: Negative for adenopathy. Does not bruise/bleed easily.   13. Psychiatric/Behavioral: Negative for confusion. The patient is not nervous/anxious.  Sleep difficulties as per above.    Objective     Physical Exam   Vital Signs: /70   Pulse 68   Temp 98 °F (36.7 °C)   Ht 167.6 cm (66\")   Wt 90.3 kg (199 lb)   SpO2 98%   BMI 32.12 kg/m²     General Appearance: alert, oriented x 3, no acute distress.  Skin: warm and dry.   HEENT: Atraumatic.  pupils round and reactive to light and accommodation, oral mucosa pink and moist.  Nares patent without epistaxis.  External auditory canals are patent tympanic membranes intact.  Neck: supple, no JVD, trachea midline.  No thyromegaly.  Nodular change noted to the thyroid.  Lungs: CTA, unlabored breathing effort.  Heart: RRR, normal S1 and S2, no S3, no rub.  Abdomen: soft, non-tender, no palpable bladder, present bowel sounds to auscultation ×4.  No guarding or rigidity.  No CVA tenderness.  Extremities: no clubbing, cyanosis.  Good range of motion actively and passively.  Symmetric muscle strength and development.  Mild synovitis noted on palpation to interphalangeal joints of bilateral hands.  Symmetric  strength bilaterally.  Normal supination/pronation of bilateral forearms, as well as extension/flexion of bilateral elbows.  Normal deltoid tone and development.  No leg length discrepancies.  Nonpitting edematous changes that extends to the knees bilaterally.  Neuro: normal speech and mental status.  Cranial nerves II through XII intact.  No anosmia. DTR 2+; proprioception intact.  No focal motor/sensory deficits.    Assessment and Plan      Assessment:   Diagnoses and all orders for this visit:    1. Gastroesophageal reflux disease without esophagitis (Primary)    2. Mixed connective tissue disease (CMS/HCC)    3. Polyarthralgia        Plan:  Substantial weight " transition over the course of the last several months.  She continues to realize therapeutic benefit of current dose of Metformin.  Most recent laboratory evaluation was unremarkable, no need for surveillance labs today.  We will repeat these at follow-up visit however.    Continue current treatment dose of Plaquenil, she is reminded to undergo twice yearly eye exams.  Keep scheduled follow-up appointment with rheumatology.    Continue PPI therapy, as well as dietary/lifestyle modifications to aid in symptom management.  She demonstrates no findings of aspiration or dysphagia today.    Vital signs demonstrate hemodynamic stability, blood pressure is at goal.    Discussion Summary:    Discussed plan of care in detail with pt today; pt verb understanding and agrees.    I spent 25 minutes caring for Jesica on this date of service. This time includes time spent by me in the following activities:preparing for the visit, performing a medically appropriate examination and/or evaluation , counseling and educating the patient/family/caregiver, ordering medications, tests, or procedures, documenting information in the medical record and care coordination    I have reviewed and updated all copied forward information, as appropriate.  I attest to the accuracy and relevance of any unchanged information.        Follow up:  Return in about 6 months (around 3/14/2022) for Recheck.     There are no Patient Instructions on file for this visit.    Sarthak Patel DO  09/14/21  11:10 EDT          Please note that portions of this note may have been completed with a voice recognition program. Efforts were made to edit the dictations, but occasionally words are mistranscribed.

## 2021-10-20 DIAGNOSIS — R73.01 IMPAIRED FASTING GLUCOSE: ICD-10-CM

## 2021-10-20 DIAGNOSIS — K21.9 GASTROESOPHAGEAL REFLUX DISEASE WITHOUT ESOPHAGITIS: ICD-10-CM

## 2021-10-20 DIAGNOSIS — G47.01 INSOMNIA DUE TO MEDICAL CONDITION: ICD-10-CM

## 2021-10-21 RX ORDER — METFORMIN HYDROCHLORIDE 500 MG/1
500 TABLET, EXTENDED RELEASE ORAL
Qty: 90 TABLET | Refills: 1 | Status: SHIPPED | OUTPATIENT
Start: 2021-10-21 | End: 2021-11-29

## 2021-10-21 RX ORDER — OMEPRAZOLE 40 MG/1
40 CAPSULE, DELAYED RELEASE ORAL DAILY
Qty: 90 CAPSULE | Refills: 3 | Status: SHIPPED | OUTPATIENT
Start: 2021-10-21 | End: 2022-10-06 | Stop reason: ALTCHOICE

## 2021-10-21 RX ORDER — DIAZEPAM 10 MG/1
10 TABLET ORAL NIGHTLY PRN
Qty: 30 TABLET | Refills: 2 | Status: SHIPPED | OUTPATIENT
Start: 2021-10-21 | End: 2022-03-16

## 2021-11-11 ENCOUNTER — OFFICE VISIT (OUTPATIENT)
Dept: FAMILY MEDICINE CLINIC | Facility: CLINIC | Age: 46
End: 2021-11-11

## 2021-11-11 VITALS
HEIGHT: 66 IN | OXYGEN SATURATION: 99 % | DIASTOLIC BLOOD PRESSURE: 84 MMHG | HEART RATE: 68 BPM | BODY MASS INDEX: 29.59 KG/M2 | WEIGHT: 184.13 LBS | SYSTOLIC BLOOD PRESSURE: 110 MMHG

## 2021-11-11 DIAGNOSIS — R60.1 GENERALIZED EDEMA: ICD-10-CM

## 2021-11-11 DIAGNOSIS — J01.00 ACUTE MAXILLARY SINUSITIS, RECURRENCE NOT SPECIFIED: Primary | ICD-10-CM

## 2021-11-11 DIAGNOSIS — R43.1 ABNORMAL SMELL: ICD-10-CM

## 2021-11-11 PROCEDURE — 99213 OFFICE O/P EST LOW 20 MIN: CPT | Performed by: FAMILY MEDICINE

## 2021-11-11 RX ORDER — FUROSEMIDE 20 MG/1
20 TABLET ORAL EVERY MORNING
Qty: 90 TABLET | Refills: 3 | Status: SHIPPED | OUTPATIENT
Start: 2021-11-11 | End: 2021-11-24

## 2021-11-11 RX ORDER — FLUCONAZOLE 100 MG/1
100 TABLET ORAL DAILY
Qty: 3 TABLET | Refills: 0 | Status: SHIPPED | OUTPATIENT
Start: 2021-11-11 | End: 2021-11-14

## 2021-11-11 RX ORDER — CLINDAMYCIN HYDROCHLORIDE 150 MG/1
150 CAPSULE ORAL
Qty: 42 CAPSULE | Refills: 0 | Status: SHIPPED | OUTPATIENT
Start: 2021-11-11 | End: 2021-11-25

## 2021-11-11 RX ORDER — DEXAMETHASONE 0.5 MG/1
TABLET ORAL
Qty: 21 TABLET | Refills: 0 | Status: SHIPPED | OUTPATIENT
Start: 2021-11-11 | End: 2021-11-17

## 2021-11-11 RX ORDER — POTASSIUM CHLORIDE 750 MG/1
10 TABLET, FILM COATED, EXTENDED RELEASE ORAL DAILY
Qty: 90 TABLET | Refills: 3 | Status: SHIPPED | OUTPATIENT
Start: 2021-11-11 | End: 2021-11-24

## 2021-11-11 NOTE — PROGRESS NOTES
Established Patient        Chief Complaint:   Chief Complaint   Patient presents with   • Loss Of Smell     complaints of smelling cigarette smoke, but states that nobody around her smokes. patient states that she had covid in march and has been vaccinated.        Jesica Green is a 46 y.o. female    History of Present Illness:   Here today for evaluation of abnormal olfactory sense, describes smelling cigarette smoke despite no smoke exposure.  She describes frontal facial pressure, particularly over the ethmoid and frontal sinuses.  Denies any epistaxis or nasal trauma.  Denies any aspiration or dysphagia.  No reports of any fever, chills or night sweats.    Subjective     The following portions of the patient's history were reviewed and updated as appropriate: allergies, current medications, past family history, past medical history, past social history, past surgical history and problem list.    Allergies   Allergen Reactions   • Ppd [Tuberculin Purified Protein Derivative] Rash       Review of Systems  1. Constitutional: Negative for fever. Negative for chills, diaphoresis.  Malaise/fatigue as per above, and unexpected weight change.   2. HENT: No dysphagia; no changes to vision/hearing/taste; no epistaxis.  Abnormal smell as per above.  3. Eyes: Negative for redness and visual disturbance.   4. Respiratory: negative for shortness of breath. Negative for chest pain . Negative for cough and chest tightness.   5. Cardiovascular: Negative for chest pain and palpitations.   6. Gastrointestinal: no BRB/BTS; no aspiration.  7. Endocrine: Negative for cold intolerance and heat intolerance.   8. Genitourinary: Negative for difficulty urinating, dysuria and frequency.   9. Musculoskeletal: Chronic arthralgias/myalgia.  10. Skin: Negative for color change, rash and wound.   11. Neurological: Negative for syncope, weakness and headaches.   12. Hematological: Negative for adenopathy. Does not bruise/bleed easily.  "  13. Psychiatric/Behavioral: Negative for confusion. The patient is not nervous/anxious.  Sleep difficulties as per above.    Objective     Physical Exam   Vital Signs: /84   Pulse 68   Ht 167.6 cm (65.98\")   Wt 83.5 kg (184 lb 2 oz)   SpO2 99%   BMI 29.73 kg/m²     General Appearance: alert, oriented x 3, no acute distress.  Skin: warm and dry.   HEENT: Atraumatic.  pupils round and reactive to light and accommodation, oral mucosa pink and moist.  Nares patent without epistaxis.  External auditory canals are patent tympanic membranes intact.  Boggy/inflamed nasal turbinates, mild tenderness on palpation of the ethmoid and frontal sinuses.  Moderate postnasal drainage without pustules or exudate, significant cobblestoning noted to the posterior pharynx.  Neck: supple, no JVD, trachea midline.  No thyromegaly.  Nodular change noted to the thyroid.  Lungs: CTA, unlabored breathing effort.  Heart: RRR, normal S1 and S2, no S3, no rub.  Abdomen: soft, non-tender, no palpable bladder, present bowel sounds to auscultation ×4.  No guarding or rigidity.  No CVA tenderness.  Extremities: no clubbing, cyanosis.  Good range of motion actively and passively.  Symmetric muscle strength and development.  Mild synovitis noted on palpation to interphalangeal joints of bilateral hands.  Symmetric  strength bilaterally.  Normal supination/pronation of bilateral forearms, as well as extension/flexion of bilateral elbows.  Normal deltoid tone and development.  No leg length discrepancies.  Nonpitting edematous changes that extends to the knees bilaterally.  Neuro: normal speech and mental status.  Cranial nerves II through XII intact.  No anosmia. DTR 2+; proprioception intact.  No focal motor/sensory deficits.    Assessment and Plan      Assessment:   Diagnoses and all orders for this visit:    1. Acute maxillary sinusitis, recurrence not specified (Primary)  -     dexamethasone (DECADRON) 0.5 MG tablet; 6 po x1d; then " 5 po x 1d; then 4 po x 1d; then 3 po x 1d; then 2 po x 1d; then 1 po x1d; then STOP  Dispense: 21 tablet; Refill: 0  -     fluconazole (Diflucan) 100 MG tablet; Take 1 tablet by mouth Daily for 3 days.  Dispense: 3 tablet; Refill: 0  -     clindamycin (CLEOCIN) 150 MG capsule; Take 1 capsule by mouth 3 (Three) Times a Day With Meals for 14 days.  Dispense: 42 capsule; Refill: 0    2. Generalized edema  -     furosemide (LASIX) 20 MG tablet; Take 1 tablet by mouth Every Morning.  Dispense: 90 tablet; Refill: 3  -     potassium chloride 10 MEQ CR tablet; Take 1 tablet by mouth Daily.  Dispense: 90 tablet; Refill: 3    3. Abnormal smell        Plan:  We will plan to treat patient for pansinusitis, this most certainly accounts for her abnormal olfactory sense.  We will utilize clindamycin for 14-day duration, as well as a 6-day low-dose dexamethasone taper.  Should she develop any ill effects to this medication she is asked notify the office immediately for potential change in treatment regimen.  She is also provided a 3-day course of Diflucan.    I have refilled patient's Lasix and potassium supplementation today.  Continue low-sodium dietary intake.  Maintain appropriate hydration status.    Vital signs demonstrate hemodynamic stability.    Discussion Summary:    Discussed plan of care in detail with pt today; pt verb understanding and agrees.    I spent 25 minutes caring for Jesica on this date of service. This time includes time spent by me in the following activities:preparing for the visit, performing a medically appropriate examination and/or evaluation , counseling and educating the patient/family/caregiver, ordering medications, tests, or procedures, documenting information in the medical record and care coordination    I have reviewed and updated all copied forward information, as appropriate.  I attest to the accuracy and relevance of any unchanged information.    Follow up:  No follow-ups on file.     There  are no Patient Instructions on file for this visit.    Sarthak Patel, DO  11/11/21  13:17 EST          Please note that portions of this note may have been completed with a voice recognition program. Efforts were made to edit the dictations, but occasionally words are mistranscribed.

## 2021-11-24 DIAGNOSIS — R60.1 GENERALIZED EDEMA: ICD-10-CM

## 2021-11-24 RX ORDER — POTASSIUM CHLORIDE 750 MG/1
10 TABLET, FILM COATED, EXTENDED RELEASE ORAL DAILY
Qty: 90 TABLET | Refills: 3 | Status: SHIPPED | OUTPATIENT
Start: 2021-11-24 | End: 2023-02-08

## 2021-11-24 RX ORDER — FUROSEMIDE 20 MG/1
20 TABLET ORAL EVERY MORNING
Qty: 90 TABLET | Refills: 3 | Status: SHIPPED | OUTPATIENT
Start: 2021-11-24 | End: 2023-02-08

## 2021-11-28 DIAGNOSIS — R73.01 IMPAIRED FASTING GLUCOSE: ICD-10-CM

## 2021-11-29 RX ORDER — METFORMIN HYDROCHLORIDE 500 MG/1
500 TABLET, EXTENDED RELEASE ORAL
Qty: 90 TABLET | Refills: 1 | Status: SHIPPED | OUTPATIENT
Start: 2021-11-29 | End: 2022-10-06 | Stop reason: SDUPTHER

## 2021-11-30 ENCOUNTER — TELEPHONE (OUTPATIENT)
Dept: FAMILY MEDICINE CLINIC | Facility: CLINIC | Age: 46
End: 2021-11-30

## 2021-11-30 DIAGNOSIS — J32.9 RECURRENT SINUSITIS: Primary | ICD-10-CM

## 2021-12-02 RX ORDER — FLUCONAZOLE 100 MG/1
100 TABLET ORAL DAILY
Qty: 3 TABLET | Refills: 0 | Status: SHIPPED | OUTPATIENT
Start: 2021-12-02 | End: 2021-12-05

## 2021-12-02 RX ORDER — DEXAMETHASONE 0.5 MG/1
TABLET ORAL
Qty: 21 TABLET | Refills: 0 | Status: SHIPPED | OUTPATIENT
Start: 2021-12-02 | End: 2022-03-14

## 2021-12-02 RX ORDER — CEFDINIR 300 MG/1
300 CAPSULE ORAL 2 TIMES DAILY
Qty: 20 CAPSULE | Refills: 0 | Status: SHIPPED | OUTPATIENT
Start: 2021-12-02 | End: 2022-03-14

## 2021-12-08 ENCOUNTER — HOSPITAL ENCOUNTER (OUTPATIENT)
Dept: CT IMAGING | Facility: HOSPITAL | Age: 46
Discharge: HOME | End: 2021-12-08
Admitting: FAMILY MEDICINE

## 2021-12-08 DIAGNOSIS — J32.9 RECURRENT SINUSITIS: ICD-10-CM

## 2021-12-08 PROCEDURE — 70486 CT MAXILLOFACIAL W/O DYE: CPT

## 2022-03-14 ENCOUNTER — OFFICE VISIT (OUTPATIENT)
Dept: FAMILY MEDICINE CLINIC | Facility: CLINIC | Age: 47
End: 2022-03-14

## 2022-03-14 VITALS
OXYGEN SATURATION: 99 % | WEIGHT: 181.4 LBS | HEART RATE: 72 BPM | SYSTOLIC BLOOD PRESSURE: 128 MMHG | TEMPERATURE: 96.9 F | DIASTOLIC BLOOD PRESSURE: 78 MMHG | HEIGHT: 66 IN | BODY MASS INDEX: 29.15 KG/M2

## 2022-03-14 DIAGNOSIS — K21.9 GASTROESOPHAGEAL REFLUX DISEASE WITHOUT ESOPHAGITIS: ICD-10-CM

## 2022-03-14 DIAGNOSIS — Z12.11 SCREEN FOR COLON CANCER: ICD-10-CM

## 2022-03-14 DIAGNOSIS — M35.1 MIXED CONNECTIVE TISSUE DISEASE: Primary | ICD-10-CM

## 2022-03-14 PROCEDURE — 99213 OFFICE O/P EST LOW 20 MIN: CPT | Performed by: FAMILY MEDICINE

## 2022-03-14 RX ORDER — HYDROXYCHLOROQUINE SULFATE 200 MG/1
1 TABLET, FILM COATED ORAL EVERY 12 HOURS
COMMUNITY
Start: 2022-03-08 | End: 2022-10-06 | Stop reason: SDUPTHER

## 2022-03-14 NOTE — PROGRESS NOTES
Established Patient        Chief Complaint:   Chief Complaint   Patient presents with   • Follow-up   • Heartburn   • Sinusitis        Jesica Green is a 47 y.o. female    History of Present Illness:   Here today in scheduled follow-up visit of her mixed connective tissue disorder and GERD.    Denies any aspiration or dysphagia.  Denies any BRB/BTS.  She has experienced significant weight transition with substantial dietary/lifestyle/exercise modifications.  She denies any chest pain, syncope, palpitations or vertigo.  Continues to be followed by rheumatology.  Denies problems with her current medication regimen.    Patient reports no active sinusitis symptoms at this time.  She completed full course of treatment as prescribed previously, underwent CT scan which demonstrated only mild mucosal changes, no findings of active sinusitis.    Subjective     The following portions of the patient's history were reviewed and updated as appropriate: allergies, current medications, past family history, past medical history, past social history, past surgical history and problem list.    Allergies   Allergen Reactions   • Ppd [Tuberculin Purified Protein Derivative] Rash       Review of Systems  1. Constitutional: Negative for fever. Negative for chills, diaphoresis.     2. HENT: No dysphagia; no changes to vision/hearing/taste; no epistaxis.  Chronic olfactory abnormality, slightly improved from last visit.  3. Eyes: Negative for redness and visual disturbance.   4. Respiratory: negative for shortness of breath. Negative for chest pain . Negative for cough and chest tightness.   5. Cardiovascular: Negative for chest pain and palpitations.   6. Gastrointestinal: no BRB/BTS; no aspiration.  7. Endocrine: Negative for cold intolerance and heat intolerance.   8. Genitourinary: Negative for difficulty urinating, dysuria and frequency.   9. Musculoskeletal: Chronic arthralgias/myalgia.  10. Skin: Negative for color change,  "rash and wound.   11. Neurological: Negative for syncope, weakness and headaches.   12. Hematological: Negative for adenopathy. Does not bruise/bleed easily.   13. Psychiatric/Behavioral: Negative for confusion. The patient is not nervous/anxious.  Sleep difficulties as per above.    Objective     Physical Exam   Vital Signs: /78   Pulse 72   Temp 96.9 °F (36.1 °C)   Ht 167.6 cm (65.98\")   Wt 82.3 kg (181 lb 6.4 oz)   SpO2 99%   BMI 29.30 kg/m²     General Appearance: alert, oriented x 3, no acute distress.  Skin: warm and dry.   HEENT: Atraumatic.  pupils round and reactive to light and accommodation, oral mucosa pink and moist.  Nares patent without epistaxis.  External auditory canals are patent tympanic membranes intact.  Boggy/inflamed nasal turbinates; mild postnasal drainage without pustules or exudate.  Neck: supple, no JVD, trachea midline.  No thyromegaly.  Nodular change noted to the thyroid.  Lungs: CTA, unlabored breathing effort.  Heart: RRR, normal S1 and S2, no S3, no rub.  Abdomen: soft, non-tender, no palpable bladder, present bowel sounds to auscultation ×4.  No guarding or rigidity.  No CVA tenderness.  Extremities: no clubbing, cyanosis.  Good range of motion actively and passively.  Symmetric muscle strength and development.  Mild synovitis noted on palpation to interphalangeal joints of bilateral hands.  Symmetric  strength bilaterally.  Normal supination/pronation of bilateral forearms, as well as extension/flexion of bilateral elbows.  Normal deltoid tone and development.  No leg length discrepancies.   Neuro: normal speech and mental status.  Cranial nerves II through XII intact.  No anosmia. DTR 2+; proprioception intact.  No focal motor/sensory deficits.    Advance Care Planning   ACP discussion was held with the patient during this visit. Patient does not have an advance directive, information provided.    Assessment and Plan      Assessment:   Diagnoses and all orders " for this visit:    1. Mixed connective tissue disease (HCC) (Primary)    2. Gastroesophageal reflux disease without esophagitis    3. Screen for colon cancer  -     Cologuard - Stool, Per Rectum; Future        Plan:  Cologuard ordered for colorectal cancer screening.    Surveillance labs as per rheumatology recommendations.    Vital signs demonstrate hemodynamic stability.    Discussed need for reduction in sodium/salt/caffeine intake; improve sleep habits as able; inc formal CV exercise program with goal of vigorous activity most, if not all, days of the week; goal of 250 min of sustained HR CV exercise total per week.    Patient has demonstrated excellent rate of weight transition, consistent with her dietary/lifestyle/exercise modifications.    Discussion Summary:    Discussed plan of care in detail with pt today; pt verb understanding and agrees.    I spent 25 minutes caring for Jesica on this date of service. This time includes time spent by me in the following activities:preparing for the visit, performing a medically appropriate examination and/or evaluation , counseling and educating the patient/family/caregiver, ordering medications, tests, or procedures, documenting information in the medical record and care coordination    I have reviewed and updated all copied forward information, as appropriate.  I attest to the accuracy and relevance of any unchanged information.    Follow up:  Return in about 6 months (around 9/14/2022) for Recheck.     There are no Patient Instructions on file for this visit.    Sarthak Patel DO  03/16/22  14:08 EDT          Please note that portions of this note may have been completed with a voice recognition program. Efforts were made to edit the dictations, but occasionally words are mistranscribed.

## 2022-03-15 DIAGNOSIS — G47.01 INSOMNIA DUE TO MEDICAL CONDITION: ICD-10-CM

## 2022-03-16 RX ORDER — DIAZEPAM 10 MG/1
TABLET ORAL
Qty: 30 TABLET | Refills: 1 | Status: SHIPPED | OUTPATIENT
Start: 2022-03-16 | End: 2022-06-02

## 2022-06-01 DIAGNOSIS — G47.01 INSOMNIA DUE TO MEDICAL CONDITION: ICD-10-CM

## 2022-06-02 RX ORDER — DIAZEPAM 10 MG/1
TABLET ORAL
Qty: 30 TABLET | Refills: 2 | Status: SHIPPED | OUTPATIENT
Start: 2022-06-02 | End: 2022-10-06 | Stop reason: SDUPTHER

## 2022-07-05 ENCOUNTER — PREP FOR SURGERY (OUTPATIENT)
Dept: OTHER | Facility: HOSPITAL | Age: 47
End: 2022-07-05

## 2022-07-05 ENCOUNTER — OFFICE VISIT (OUTPATIENT)
Dept: OBSTETRICS AND GYNECOLOGY | Facility: CLINIC | Age: 47
End: 2022-07-05

## 2022-07-05 VITALS
BODY MASS INDEX: 29.96 KG/M2 | SYSTOLIC BLOOD PRESSURE: 132 MMHG | HEIGHT: 66 IN | DIASTOLIC BLOOD PRESSURE: 80 MMHG | WEIGHT: 186.4 LBS

## 2022-07-05 DIAGNOSIS — Z12.31 ENCOUNTER FOR SCREENING MAMMOGRAM FOR MALIGNANT NEOPLASM OF BREAST: ICD-10-CM

## 2022-07-05 DIAGNOSIS — N88.9 CERVICAL LESION: ICD-10-CM

## 2022-07-05 DIAGNOSIS — N88.9 CERVICAL LESION: Primary | ICD-10-CM

## 2022-07-05 DIAGNOSIS — Z12.4 SCREENING FOR CERVICAL CANCER: ICD-10-CM

## 2022-07-05 DIAGNOSIS — Z01.419 WELL WOMAN EXAM WITH ROUTINE GYNECOLOGICAL EXAM: Primary | ICD-10-CM

## 2022-07-05 PROCEDURE — 99213 OFFICE O/P EST LOW 20 MIN: CPT | Performed by: PHYSICIAN ASSISTANT

## 2022-07-05 PROCEDURE — 99396 PREV VISIT EST AGE 40-64: CPT | Performed by: PHYSICIAN ASSISTANT

## 2022-07-05 RX ORDER — SODIUM CHLORIDE 0.9 % (FLUSH) 0.9 %
10 SYRINGE (ML) INJECTION AS NEEDED
Status: CANCELLED | OUTPATIENT
Start: 2022-07-05

## 2022-07-05 RX ORDER — SODIUM CHLORIDE 0.9 % (FLUSH) 0.9 %
3 SYRINGE (ML) INJECTION EVERY 12 HOURS SCHEDULED
Status: CANCELLED | OUTPATIENT
Start: 2022-07-05

## 2022-07-05 NOTE — PROGRESS NOTES
Subjective   Chief Complaint   Patient presents with   • Gynecologic Exam     Last pap done 21-WNL, MMG is due, No complaints       Jesica Green is a 47 y.o. year old  presenting to be seen for her annual gynecological exam.   Has no complaints or concerns  LMP 2 weeks ago and having regular monthly bleeds  Screening mammogram was due in march  Vasectomy for birth control     Past Medical History:   Diagnosis Date   • Acid reflux    • Anemia    • Asthma    • History of Papanicolaou smear of cervix    • Lupus (HCC)    • MCTD (mixed connective tissue disease) (HCC)    • Positive PPD    • Varicella         Current Outpatient Medications:   •  diazePAM (VALIUM) 10 MG tablet, TAKE 1 TABLET BY MOUTH AT NIGHT AS NEEDED FOR ANXIETY OR MUSCLE SPASMS OR INSOMNIA, Disp: 30 tablet, Rfl: 2  •  furosemide (LASIX) 20 MG tablet, TAKE 1 TABLET BY MOUTH EVERY MORNING, Disp: 90 tablet, Rfl: 3  •  hydroxychloroquine (PLAQUENIL) 200 MG tablet, Take 1 tablet by mouth Every 12 (Twelve) Hours., Disp: , Rfl:   •  metFORMIN ER (GLUCOPHAGE-XR) 500 MG 24 hr tablet, TAKE 1 TABLET BY MOUTH DAILY WITH BREAKFAST, Disp: 90 tablet, Rfl: 1  •  omeprazole (priLOSEC) 40 MG capsule, Take 1 capsule by mouth Daily., Disp: 90 capsule, Rfl: 3  •  potassium chloride 10 MEQ CR tablet, TAKE 1 TABLET BY MOUTH DAILY, Disp: 90 tablet, Rfl: 3   Allergies   Allergen Reactions   • Ppd [Tuberculin Purified Protein Derivative] Rash      Past Surgical History:   Procedure Laterality Date   • ACHILLES TENDON REPAIR     • REDUCTION MAMMAPLASTY     • TONSILLECTOMY     • US GUIDED FINE NEEDLE ASPIRATION  2021   • WISDOM TOOTH EXTRACTION        Social History     Socioeconomic History   • Marital status:    Tobacco Use   • Smoking status: Never Smoker   • Smokeless tobacco: Never Used   Vaping Use   • Vaping Use: Never used   Substance and Sexual Activity   • Alcohol use: Yes     Alcohol/week: 2.0 standard drinks     Types: 2 Glasses  "of wine per week     Comment: On occasion - this is not every week nor in same week   • Drug use: No   • Sexual activity: Yes     Partners: Male     Birth control/protection: None     Comment: My  had a vasectomy      Family History   Problem Relation Age of Onset   • Thyroid disease Mother    • Hyperlipidemia Mother    • Breast cancer Maternal Aunt    • Cervical cancer Maternal Aunt    • Hypertension Father    • Hyperlipidemia Father    • Hyperlipidemia Maternal Grandmother    • Ovarian cancer Maternal Grandmother    • Rheum arthritis Other    • Cancer Other         colon ovarian and breast       Review of Systems   Constitutional: Negative for chills, diaphoresis and fever.   Gastrointestinal: Negative.    Genitourinary: Negative for difficulty urinating, dysuria, menstrual problem, pelvic pain, vaginal bleeding, vaginal discharge and vaginal pain.           Objective   /80   Ht 167.6 cm (66\")   Wt 84.6 kg (186 lb 6.4 oz)   LMP 06/21/2022 (Approximate)   Breastfeeding No   BMI 30.09 kg/m²     Physical Exam  Constitutional:       Appearance: Normal appearance. She is well-developed and well-groomed.   Eyes:      General: Lids are normal.      Extraocular Movements: Extraocular movements intact.      Conjunctiva/sclera: Conjunctivae normal.   Chest:   Breasts: Breasts are symmetrical.      Right: No inverted nipple, mass, nipple discharge, skin change, tenderness or axillary adenopathy.      Left: No inverted nipple, mass, nipple discharge, skin change, tenderness or axillary adenopathy.       Abdominal:      Palpations: Abdomen is soft.      Tenderness: There is no abdominal tenderness.   Genitourinary:     Labia:         Right: No rash, tenderness or lesion.         Left: No rash, tenderness or lesion.       Urethra: No prolapse, urethral pain, urethral swelling or urethral lesion.      Vagina: No vaginal discharge, tenderness or lesions.      Cervix: Lesion present. No discharge or cervical " bleeding.      Uterus: Not enlarged and not tender.       Adnexa:         Right: No mass or tenderness.          Left: No mass or tenderness.        Rectum: No external hemorrhoid.            Comments: 2 cm cervical lesion/tissue projection noted at 1-2 oclock well away from cervical os. Has whitish cystic lesion center of tissue    Lymphadenopathy:      Upper Body:      Right upper body: No axillary adenopathy.      Left upper body: No axillary adenopathy.   Skin:     General: Skin is warm and dry.      Findings: No bruising or lesion.   Neurological:      Mental Status: She is alert.   Psychiatric:         Attention and Perception: Attention normal.         Mood and Affect: Mood normal.         Speech: Speech normal.         Behavior: Behavior is cooperative.            Result Review :                   Assessment and Plan  Diagnoses and all orders for this visit:    1. Well woman exam with routine gynecological exam (Primary)    2. Screening for cervical cancer  -     LIQUID-BASED PAP SMEAR, [facility] ([facility])    3. Encounter for screening mammogram for malignant neoplasm of breast  -     Mammo Screening Digital Tomosynthesis Bilateral With CAD; Future    4. Cervical lesion      Patient Instructions   Self breast exam monthly  Annual screening mammogram   Regular exercise    Cervical lesion has benign appearance and patient offered option of surveillance but she would like removal of lesion for definite pathology             This note was electronically signed.    Kristina Ford PA-C   July 5, 2022

## 2022-07-05 NOTE — PATIENT INSTRUCTIONS
Self breast exam monthly  Annual screening mammogram   Regular exercise    Cervical lesion has benign appearance and patient offered option of surveillance but she would like removal of lesion for definite pathology

## 2022-07-06 ENCOUNTER — TELEPHONE (OUTPATIENT)
Dept: PREADMISSION TESTING | Facility: HOSPITAL | Age: 47
End: 2022-07-06

## 2022-07-06 PROBLEM — N88.9 CERVICAL LESION: Status: ACTIVE | Noted: 2022-07-06

## 2022-07-07 ENCOUNTER — TELEPHONE (OUTPATIENT)
Dept: OBSTETRICS AND GYNECOLOGY | Facility: CLINIC | Age: 47
End: 2022-07-07

## 2022-07-07 ENCOUNTER — PRE-ADMISSION TESTING (OUTPATIENT)
Dept: PREADMISSION TESTING | Facility: HOSPITAL | Age: 47
End: 2022-07-07

## 2022-07-07 VITALS — WEIGHT: 183 LBS | BODY MASS INDEX: 29.41 KG/M2 | HEIGHT: 66 IN

## 2022-07-07 DIAGNOSIS — N88.9 CERVICAL LESION: ICD-10-CM

## 2022-07-07 LAB
B-HCG UR QL: NEGATIVE
BACTERIA UR QL AUTO: ABNORMAL /HPF
BASOPHILS # BLD AUTO: 0.03 10*3/MM3 (ref 0–0.2)
BASOPHILS NFR BLD AUTO: 0.5 % (ref 0–1.5)
BILIRUB UR QL STRIP: NEGATIVE
CLARITY UR: CLEAR
COLOR UR: YELLOW
DEPRECATED RDW RBC AUTO: 40.5 FL (ref 37–54)
EOSINOPHIL # BLD AUTO: 0.05 10*3/MM3 (ref 0–0.4)
EOSINOPHIL NFR BLD AUTO: 0.8 % (ref 0.3–6.2)
ERYTHROCYTE [DISTWIDTH] IN BLOOD BY AUTOMATED COUNT: 12.4 % (ref 12.3–15.4)
GLUCOSE UR STRIP-MCNC: NEGATIVE MG/DL
HCT VFR BLD AUTO: 37.6 % (ref 34–46.6)
HGB BLD-MCNC: 12.9 G/DL (ref 12–15.9)
HGB UR QL STRIP.AUTO: NEGATIVE
HYALINE CASTS UR QL AUTO: ABNORMAL /LPF
IMM GRANULOCYTES # BLD AUTO: 0.02 10*3/MM3 (ref 0–0.05)
IMM GRANULOCYTES NFR BLD AUTO: 0.3 % (ref 0–0.5)
KETONES UR QL STRIP: NEGATIVE
LEUKOCYTE ESTERASE UR QL STRIP.AUTO: ABNORMAL
LYMPHOCYTES # BLD AUTO: 1.92 10*3/MM3 (ref 0.7–3.1)
LYMPHOCYTES NFR BLD AUTO: 28.8 % (ref 19.6–45.3)
MCH RBC QN AUTO: 30.4 PG (ref 26.6–33)
MCHC RBC AUTO-ENTMCNC: 34.3 G/DL (ref 31.5–35.7)
MCV RBC AUTO: 88.7 FL (ref 79–97)
MONOCYTES # BLD AUTO: 0.41 10*3/MM3 (ref 0.1–0.9)
MONOCYTES NFR BLD AUTO: 6.2 % (ref 5–12)
NEUTROPHILS NFR BLD AUTO: 4.23 10*3/MM3 (ref 1.7–7)
NEUTROPHILS NFR BLD AUTO: 63.4 % (ref 42.7–76)
NITRITE UR QL STRIP: NEGATIVE
NRBC BLD AUTO-RTO: 0 /100 WBC (ref 0–0.2)
PH UR STRIP.AUTO: 6 [PH] (ref 5–8)
PLATELET # BLD AUTO: 222 10*3/MM3 (ref 140–450)
PMV BLD AUTO: 11.1 FL (ref 6–12)
PROT UR QL STRIP: NEGATIVE
RBC # BLD AUTO: 4.24 10*6/MM3 (ref 3.77–5.28)
RBC # UR STRIP: ABNORMAL /HPF
REF LAB TEST METHOD: ABNORMAL
SP GR UR STRIP: 1.01 (ref 1–1.03)
SQUAMOUS #/AREA URNS HPF: ABNORMAL /HPF
UROBILINOGEN UR QL STRIP: ABNORMAL
WBC # UR STRIP: ABNORMAL /HPF
WBC NRBC COR # BLD: 6.66 10*3/MM3 (ref 3.4–10.8)

## 2022-07-07 PROCEDURE — 85025 COMPLETE CBC W/AUTO DIFF WBC: CPT

## 2022-07-07 PROCEDURE — 81001 URINALYSIS AUTO W/SCOPE: CPT

## 2022-07-07 PROCEDURE — 81025 URINE PREGNANCY TEST: CPT

## 2022-07-07 PROCEDURE — 36415 COLL VENOUS BLD VENIPUNCTURE: CPT

## 2022-07-07 RX ORDER — ERGOCALCIFEROL (VITAMIN D2) 10 MCG
400 TABLET ORAL DAILY
COMMUNITY

## 2022-07-07 RX ORDER — BIOTIN 1000 MCG
TABLET,CHEWABLE ORAL
COMMUNITY

## 2022-07-07 RX ORDER — LANOLIN ALCOHOL/MO/W.PET/CERES
1000 CREAM (GRAM) TOPICAL DAILY
COMMUNITY

## 2022-07-07 NOTE — DISCHARGE INSTRUCTIONS

## 2022-07-07 NOTE — TELEPHONE ENCOUNTER
----- Message from Luzmaria Waite sent at 7/7/2022 10:03 AM EDT -----  Regarding: SURGERY SCHEDULED ON 07/12/22 with Dr Branch  Patient is scheduled to have a cervical lesion removed and she will be on day two of her cycle, Will this be okay or should I move her date.    Thanks,  Luzmaria

## 2022-07-08 LAB — REF LAB TEST METHOD: NORMAL

## 2022-07-12 ENCOUNTER — HOSPITAL ENCOUNTER (OUTPATIENT)
Facility: HOSPITAL | Age: 47
Setting detail: HOSPITAL OUTPATIENT SURGERY
Discharge: HOME | End: 2022-07-12
Attending: OBSTETRICS & GYNECOLOGY | Admitting: OBSTETRICS & GYNECOLOGY

## 2022-07-12 ENCOUNTER — ANESTHESIA (OUTPATIENT)
Dept: PERIOP | Facility: HOSPITAL | Age: 47
End: 2022-07-12

## 2022-07-12 ENCOUNTER — ANESTHESIA EVENT (OUTPATIENT)
Dept: PERIOP | Facility: HOSPITAL | Age: 47
End: 2022-07-12

## 2022-07-12 VITALS
HEART RATE: 62 BPM | RESPIRATION RATE: 18 BRPM | OXYGEN SATURATION: 98 % | TEMPERATURE: 97 F | DIASTOLIC BLOOD PRESSURE: 83 MMHG | SYSTOLIC BLOOD PRESSURE: 135 MMHG

## 2022-07-12 DIAGNOSIS — N88.9 CERVICAL LESION: ICD-10-CM

## 2022-07-12 PROCEDURE — S0260 H&P FOR SURGERY: HCPCS | Performed by: OBSTETRICS & GYNECOLOGY

## 2022-07-12 PROCEDURE — 25010000002 ONDANSETRON PER 1 MG: Performed by: NURSE ANESTHETIST, CERTIFIED REGISTERED

## 2022-07-12 PROCEDURE — 25010000002 FENTANYL CITRATE (PF) 50 MCG/ML SOLUTION: Performed by: NURSE ANESTHETIST, CERTIFIED REGISTERED

## 2022-07-12 PROCEDURE — 57500 BIOPSY OF CERVIX: CPT | Performed by: OBSTETRICS & GYNECOLOGY

## 2022-07-12 PROCEDURE — 25010000002 DEXAMETHASONE PER 1 MG: Performed by: NURSE ANESTHETIST, CERTIFIED REGISTERED

## 2022-07-12 PROCEDURE — 25010000002 KETOROLAC TROMETHAMINE PER 15 MG: Performed by: NURSE ANESTHETIST, CERTIFIED REGISTERED

## 2022-07-12 RX ORDER — SODIUM CHLORIDE 0.9 % (FLUSH) 0.9 %
3 SYRINGE (ML) INJECTION EVERY 12 HOURS SCHEDULED
Status: DISCONTINUED | OUTPATIENT
Start: 2022-07-12 | End: 2022-07-12 | Stop reason: HOSPADM

## 2022-07-12 RX ORDER — MEPERIDINE HYDROCHLORIDE 25 MG/ML
12.5 INJECTION INTRAMUSCULAR; INTRAVENOUS; SUBCUTANEOUS
Status: DISCONTINUED | OUTPATIENT
Start: 2022-07-12 | End: 2022-07-12 | Stop reason: HOSPADM

## 2022-07-12 RX ORDER — LIDOCAINE HYDROCHLORIDE AND EPINEPHRINE 10; 10 MG/ML; UG/ML
INJECTION, SOLUTION INFILTRATION; PERINEURAL AS NEEDED
Status: DISCONTINUED | OUTPATIENT
Start: 2022-07-12 | End: 2022-07-12 | Stop reason: HOSPADM

## 2022-07-12 RX ORDER — KETOROLAC TROMETHAMINE 30 MG/ML
INJECTION, SOLUTION INTRAMUSCULAR; INTRAVENOUS AS NEEDED
Status: DISCONTINUED | OUTPATIENT
Start: 2022-07-12 | End: 2022-07-12 | Stop reason: SURG

## 2022-07-12 RX ORDER — DEXAMETHASONE SODIUM PHOSPHATE 4 MG/ML
INJECTION, SOLUTION INTRA-ARTICULAR; INTRALESIONAL; INTRAMUSCULAR; INTRAVENOUS; SOFT TISSUE AS NEEDED
Status: DISCONTINUED | OUTPATIENT
Start: 2022-07-12 | End: 2022-07-12 | Stop reason: SURG

## 2022-07-12 RX ORDER — IBUPROFEN 600 MG/1
600 TABLET ORAL EVERY 6 HOURS PRN
Status: DISCONTINUED | OUTPATIENT
Start: 2022-07-12 | End: 2022-07-12 | Stop reason: HOSPADM

## 2022-07-12 RX ORDER — HYDROCODONE BITARTRATE AND ACETAMINOPHEN 5; 325 MG/1; MG/1
1 TABLET ORAL ONCE AS NEEDED
Status: DISCONTINUED | OUTPATIENT
Start: 2022-07-12 | End: 2022-07-12 | Stop reason: HOSPADM

## 2022-07-12 RX ORDER — SODIUM CHLORIDE 0.9 % (FLUSH) 0.9 %
10 SYRINGE (ML) INJECTION AS NEEDED
Status: DISCONTINUED | OUTPATIENT
Start: 2022-07-12 | End: 2022-07-12 | Stop reason: HOSPADM

## 2022-07-12 RX ORDER — ONDANSETRON 2 MG/ML
INJECTION INTRAMUSCULAR; INTRAVENOUS AS NEEDED
Status: DISCONTINUED | OUTPATIENT
Start: 2022-07-12 | End: 2022-07-12 | Stop reason: SURG

## 2022-07-12 RX ORDER — LORAZEPAM 2 MG/ML
1 INJECTION INTRAMUSCULAR
Status: DISCONTINUED | OUTPATIENT
Start: 2022-07-12 | End: 2022-07-12 | Stop reason: HOSPADM

## 2022-07-12 RX ORDER — PROCHLORPERAZINE EDISYLATE 5 MG/ML
10 INJECTION INTRAMUSCULAR; INTRAVENOUS ONCE AS NEEDED
Status: DISCONTINUED | OUTPATIENT
Start: 2022-07-12 | End: 2022-07-12 | Stop reason: HOSPADM

## 2022-07-12 RX ORDER — IBUPROFEN 800 MG/1
800 TABLET ORAL EVERY 8 HOURS PRN
Qty: 30 TABLET | Refills: 0 | Status: SHIPPED | OUTPATIENT
Start: 2022-07-12 | End: 2022-07-22

## 2022-07-12 RX ORDER — FENTANYL CITRATE 50 UG/ML
INJECTION, SOLUTION INTRAMUSCULAR; INTRAVENOUS AS NEEDED
Status: DISCONTINUED | OUTPATIENT
Start: 2022-07-12 | End: 2022-07-12 | Stop reason: SURG

## 2022-07-12 RX ORDER — ACETAMINOPHEN 500 MG
1000 TABLET ORAL EVERY 8 HOURS PRN
Qty: 60 TABLET | Refills: 0 | Status: SHIPPED | OUTPATIENT
Start: 2022-07-12 | End: 2022-07-22

## 2022-07-12 RX ORDER — KETAMINE HYDROCHLORIDE 50 MG/ML
INJECTION, SOLUTION, CONCENTRATE INTRAMUSCULAR; INTRAVENOUS AS NEEDED
Status: DISCONTINUED | OUTPATIENT
Start: 2022-07-12 | End: 2022-07-12 | Stop reason: SURG

## 2022-07-12 RX ORDER — SODIUM CHLORIDE, SODIUM LACTATE, POTASSIUM CHLORIDE, CALCIUM CHLORIDE 600; 310; 30; 20 MG/100ML; MG/100ML; MG/100ML; MG/100ML
25 INJECTION, SOLUTION INTRAVENOUS CONTINUOUS
Status: DISCONTINUED | OUTPATIENT
Start: 2022-07-12 | End: 2022-07-12 | Stop reason: HOSPADM

## 2022-07-12 RX ADMIN — SODIUM CHLORIDE, POTASSIUM CHLORIDE, SODIUM LACTATE AND CALCIUM CHLORIDE 25 ML/HR: 600; 310; 30; 20 INJECTION, SOLUTION INTRAVENOUS at 10:00

## 2022-07-12 RX ADMIN — KETAMINE HYDROCHLORIDE 50 MG: 50 INJECTION, SOLUTION INTRAMUSCULAR; INTRAVENOUS at 10:18

## 2022-07-12 RX ADMIN — FENTANYL CITRATE 100 MCG: 50 INJECTION INTRAMUSCULAR; INTRAVENOUS at 10:18

## 2022-07-12 RX ADMIN — KETOROLAC TROMETHAMINE 30 MG: 30 INJECTION, SOLUTION INTRAMUSCULAR at 10:18

## 2022-07-12 RX ADMIN — DEXAMETHASONE SODIUM PHOSPHATE 4 MG: 4 INJECTION, SOLUTION INTRAMUSCULAR; INTRAVENOUS at 10:18

## 2022-07-12 RX ADMIN — ONDANSETRON 4 MG: 2 INJECTION INTRAMUSCULAR; INTRAVENOUS at 10:18

## 2022-07-12 NOTE — DISCHARGE INSTRUCTIONS
Pelvic Surgery  Home Care Instructions:  Dr. Adrian Branch      Thank you for choosing [facility]. Please let us know if you have questions or concerns or do not understand the information we give you. Always ask us to explain words or phrases you do not understand.    You have had pelvic surgery. Here are some basic guidelines to help you recover more quickly at home. Your doctor may give you more guidelines. If you have any questions after you go home, please call the numbers listed on the next page.    General Guidelines    1. You may resume normal daily activities.  2. Do not put anything in the vagina (no tampons or douching).    3.   Do not have sex until cleared by your physician.  4.   You may climb steps.  5. You may bathe or shower.  6. The only exercise you should do is walking. This is important for your recovery.  7. Do not drive while taking narcotics.  8. Take the medicines you were taking before surgery. Other medicines you need to take at home are:    Alternate Motrin and Tylenol around the clock. Take each every 8 hours in alternation so that you are getting one of the medications every 4 hours.      Metamucil + Colace daily to keep bowel movements soft        If you have questions about your medicines, let us know. Or, talk to your local pharmacist.    Surgery, lack of activity, pain medicines and iron pills tend to cause constipation. Take something every day to prevent constipation and straining.  Taking something like Metamucil® every day to increase fiber may prevent constipation. Follow the instructions on the container. If you need a gentle laxative, then something like Milk of Magnesia® or Correctol® work fairly well. You should not need to take laxatives often.    10. Call your doctor if you have:     a. Fever of 101 degrees or higher.  b. Heavy vaginal bleeding.  c. Increased redness around your incision (cut); incision pulling apart; drainage (pus), bleeding, or a large  amount of fluid from your incision.  d. Worsening nausea or pain.  e. Other problems or concern.    If you have any questions or concerns about your usual routines, please talk to the nurse or doctor.       To talk with your doctor at [facility], call:  [facility]  Phone: [phone]      We appreciate the opportunity you have given us to participate in your care.     -------------------------------------------------------------------------------------------------------------------------------------------------------------------------------------------------    Rest today.    No pushing, pulling, tugging,  heavy lifting, or strenuous activity.  No major decision making, driving, or drinking alcoholic beverages for 24 hours. ( due to the medications you have  received)  Always use good hand hygiene/washing techniques.  NO driving while taking pain medications.    * if you have an incision:  Check your incision area every day for signs of infection.   Check for:  * more redness, swelling, or pain  *more fluid or blood  *warmth  *pus or bad smell.    To assist you in voiding:  Drink plenty of fluids  Listen to running water while attempting to void.    If you are unable to urinate and you have an uncomfortable urge to void or it has been   6 hours since you were discharged, return to the Emergency Room.    Pelvic rest is best described as not putting anything in your vagina. This includes tampons, douching, tub bathing or sexual activity.

## 2022-07-12 NOTE — ANESTHESIA PREPROCEDURE EVALUATION
Anesthesia Evaluation     Patient summary reviewed and Nursing notes reviewed   NPO Solid Status: > 8 hours  NPO Liquid Status: > 8 hours           Airway   Mallampati: II  TM distance: >3 FB  Neck ROM: full  Possible difficult intubation  Dental - normal exam     Pulmonary - normal exam   (+) asthma,  Cardiovascular - normal exam        Neuro/Psych  GI/Hepatic/Renal/Endo    (+)  GERD well controlled,  diabetes mellitus type 2 well controlled,     Musculoskeletal     Abdominal  - normal exam   Substance History      OB/GYN          Other                        Anesthesia Plan    ASA 2     MAC     intravenous induction     Anesthetic plan, risks, benefits, and alternatives have been provided, discussed and informed consent has been obtained with: patient.    Plan discussed with CRNA.        CODE STATUS:

## 2022-07-12 NOTE — H&P
GYNECOLOGY HISTORY AND PHYSICAL    CHIEF COMPLAINT: Scheduled surgery    DIAGNOSIS:  Cervical lesion    ASSESSMENT/PLAN     47 y.o.  female who presents for scheduled exam under anesthesia, removal of cervical lesion and all other indicated procedures.    - Proceed to the OR for scheduled surgery  - Risks for the procedure reviewed  - SCDs for DVT ppx  - Antibiotics: none    HISTORY OF PRESENT ILLNESS     47 y.o.  female who presents for the scheduled procedure listed above.    She has no complaints today and there are no interval changes to the history.    REVIEW OF SYSTEMS: A complete review of systems was performed and was specifically negative for headache, changes in vision, RUQ pain, shortness of breath, chest pain, lower extremity edema and dysuria.     HISTORY:  Obstetrical History:  OB History    Para Term  AB Living   2 2 2     2   SAB IAB Ectopic Molar Multiple Live Births             2      # Outcome Date GA Lbr Venkat/2nd Weight Sex Delivery Anes PTL Lv   2 Term            1 Term                Past Medical History:  Past Medical History:   Diagnosis Date   • Acid reflux    • Anemia    • Asthma    • COVID-19 vaccine series completed    • History of Papanicolaou smear of cervix    • Lupus (HCC)    • MCTD (mixed connective tissue disease) ([facility])    • Positive PPD    • Varicella        Past Surgical History:  Past Surgical History:   Procedure Laterality Date   • ACHILLES TENDON REPAIR Right    • ENDOSCOPY     • REDUCTION MAMMAPLASTY     • TONSILLECTOMY     • US GUIDED FINE NEEDLE ASPIRATION  2021    thyroid biopsy   • WISDOM TOOTH EXTRACTION         Social History:  Social History     Tobacco Use   • Smoking status: Never Smoker   • Smokeless tobacco: Never Used   Vaping Use   • Vaping Use: Never used   Substance Use Topics   • Alcohol use: Yes     Alcohol/week: 2.0 standard drinks     Types: 2 Glasses of wine per week     Comment: On occasion - this is not every  week nor in same week   • Drug use: No       Family History  Family History   Problem Relation Age of Onset   • Thyroid disease Mother    • Hyperlipidemia Mother    • Breast cancer Maternal Aunt    • Cervical cancer Maternal Aunt    • Hypertension Father    • Hyperlipidemia Father    • Hyperlipidemia Maternal Grandmother    • Ovarian cancer Maternal Grandmother    • Rheum arthritis Other    • Cancer Other         colon ovarian and breast        Allergies:   Allergies   Allergen Reactions   • Ppd [Tuberculin Purified Protein Derivative] Rash       OBJECTIVE   VITALS:   See flowsheet    PHYSICAL EXAM:  GENERAL: NAD, alert  CHEST: No increased work of breathing, CTAB  CV: RRR, WWP  ABDOMEN: Soft, NTTP  EXTREMITIES:  Warm and well-perfused, nontender, nonedematous  NEURO: AAO x 3, CN II-XII grossly intact    No current facility-administered medications on file prior to encounter.     Current Outpatient Medications on File Prior to Encounter   Medication Sig Dispense Refill   • diazePAM (VALIUM) 10 MG tablet TAKE 1 TABLET BY MOUTH AT NIGHT AS NEEDED FOR ANXIETY OR MUSCLE SPASMS OR INSOMNIA 30 tablet 2   • furosemide (LASIX) 20 MG tablet TAKE 1 TABLET BY MOUTH EVERY MORNING 90 tablet 3   • hydroxychloroquine (PLAQUENIL) 200 MG tablet Take 1 tablet by mouth Every 12 (Twelve) Hours.     • metFORMIN ER (GLUCOPHAGE-XR) 500 MG 24 hr tablet TAKE 1 TABLET BY MOUTH DAILY WITH BREAKFAST 90 tablet 1   • omeprazole (priLOSEC) 40 MG capsule Take 1 capsule by mouth Daily. 90 capsule 3   • potassium chloride 10 MEQ CR tablet TAKE 1 TABLET BY MOUTH DAILY 90 tablet 3       DIAGNOSTIC STUDIES:  Results from last 7 days   Lab Units 07/07/22  1716   WBC 10*3/mm3 6.66   HEMOGLOBIN g/dL 12.9   HEMATOCRIT % 37.6   PLATELETS 10*3/mm3 222       No results found for this or any previous visit (from the past 24 hour(s)).    Adrian Branch MD  Obstetrics and Gynecology  [facility]

## 2022-07-12 NOTE — OP NOTE
[facility]  Jesica Green  : 1975  MRN: [id number]  CSN: [id number]  Date: 2022    Operative Report    Pre-op Diagnosis:  Cervical lesion [N88.9]   Post-op Diagnosis:  Same   Procedure: EXAM UNDER ANESTHESIA  REMOVAL CERVICAL LESION   Surgeon:  Surgical assistant: MARY Reis was responsible for performing the following activities: Retraction and their skilled assistance was necessary for the success of this case.     OR Staff: Circulator: Ana Maria Aceves RN  Scrub Person: Price Voss     Anesthesia: Monitored Anesthesia Care   Estimated Blood Loss: 10 mls   ABx: None   Specimens:  Cervical lesion       Complications: none     Description of Procedure:    After anesthesia was determined to be adequate and an appropriate timeout was performed, the patient was prepped and draped in the usual sterile fashion. The patient had been placed in the dorsal lithotomy position using Raúl stirrups.  The bladder was drained with a red rubber catheter per nursing.  A heavy-weighted speculum and Avilez retractor were placed in the vagina.  The cervix was inspected and a 2 cm cervical lesion was noted.  This lesion likely reflects an inflamed cervical polyp.  The lesion was elevated with pickups and excised with Metzenbaum scissors.  The specimen was labeled and sent to pathology for review.  The base of the lesion was cauterized with Bovie.  The area was oversewn with 3-0 chromic suture.  Monsel's solution was applied.  Adequate hemostasis was noted.  The patient tolerated the procedure well. There were no complications. All instrument and sponge counts were correct at the end of the procedure.  She was taken to postoperative recovery room in stable condition.    Adrian Branch MD  Obstetrics and Gynecology  [facility]

## 2022-07-12 NOTE — ANESTHESIA POSTPROCEDURE EVALUATION
Patient: Jesica Green    Procedure Summary     Date: 07/12/22 Room / Location: [facility] OR 1 /  [facility]    Anesthesia Start: 1013 Anesthesia Stop: 1049    Procedure: EXAM UNDER ANESTHESIA, REMOVAL CERVICAL LESION (N/A ) Diagnosis:       Cervical lesion      (Cervical lesion [N88.9])    Surgeons: Adrian Branch MD Provider: August Aiken CRNA    Anesthesia Type: MAC ASA Status: 2          Anesthesia Type: MAC    Vitals  Vitals Value Taken Time   /72 07/12/22 1046   Temp 97 °F (36.1 °C) 07/12/22 1046   Pulse 68 07/12/22 1046   Resp 16 07/12/22 1046   SpO2 98 % 07/12/22 1046           Post Anesthesia Care and Evaluation    Patient location during evaluation: bedside  Patient participation: complete - patient participated  Level of consciousness: awake  Pain score: 0  Pain management: adequate    Airway patency: patent  Anesthetic complications: No anesthetic complications  PONV Status: controlled  Cardiovascular status: acceptable and stable  Respiratory status: acceptable and room air  Hydration status: acceptable    Comments: Vital signs as noted in nursing documentation as per protocol

## 2022-07-13 LAB — REF LAB TEST METHOD: NORMAL

## 2022-07-22 ENCOUNTER — OFFICE VISIT (OUTPATIENT)
Dept: OBSTETRICS AND GYNECOLOGY | Facility: CLINIC | Age: 47
End: 2022-07-22

## 2022-07-22 VITALS
HEIGHT: 66 IN | SYSTOLIC BLOOD PRESSURE: 114 MMHG | WEIGHT: 183.8 LBS | BODY MASS INDEX: 29.54 KG/M2 | DIASTOLIC BLOOD PRESSURE: 80 MMHG

## 2022-07-22 DIAGNOSIS — Z09 POSTOPERATIVE FOLLOW-UP: Primary | ICD-10-CM

## 2022-07-22 PROCEDURE — 99024 POSTOP FOLLOW-UP VISIT: CPT | Performed by: PHYSICIAN ASSISTANT

## 2022-07-22 NOTE — PROGRESS NOTES
Subjective   Chief Complaint   Patient presents with   • Post-op     10 days post-op Exam under anesthesia, removal of cervical lesion, doing well       Jesica Green is a 47 y.o. year old  presenting to be seen for post op visit  She is doing well 10 days post procedure  Normal bowel and bladder function  No vaginal bleeding. Started period one day post procedure and normal bleed  Pathology dilated nabothian cyst    Past Medical History:   Diagnosis Date   • Acid reflux    • Anemia    • Asthma    • COVID-19 vaccine series completed    • History of Papanicolaou smear of cervix    • Lupus (HCC)    • MCTD (mixed connective tissue disease) (HCC)    • Positive PPD    • Varicella         Current Outpatient Medications:   •  Biotin 1000 MCG chewable tablet, Chew., Disp: , Rfl:   •  diazePAM (VALIUM) 10 MG tablet, TAKE 1 TABLET BY MOUTH AT NIGHT AS NEEDED FOR ANXIETY OR MUSCLE SPASMS OR INSOMNIA, Disp: 30 tablet, Rfl: 2  •  furosemide (LASIX) 20 MG tablet, TAKE 1 TABLET BY MOUTH EVERY MORNING, Disp: 90 tablet, Rfl: 3  •  hydroxychloroquine (PLAQUENIL) 200 MG tablet, Take 1 tablet by mouth Every 12 (Twelve) Hours., Disp: , Rfl:   •  metFORMIN ER (GLUCOPHAGE-XR) 500 MG 24 hr tablet, TAKE 1 TABLET BY MOUTH DAILY WITH BREAKFAST, Disp: 90 tablet, Rfl: 1  •  omeprazole (priLOSEC) 40 MG capsule, Take 1 capsule by mouth Daily., Disp: 90 capsule, Rfl: 3  •  potassium chloride 10 MEQ CR tablet, TAKE 1 TABLET BY MOUTH DAILY, Disp: 90 tablet, Rfl: 3  •  vitamin B-12 (CYANOCOBALAMIN) 1000 MCG tablet, Take 1,000 mcg by mouth Daily., Disp: , Rfl:   •  Vitamin D, Cholecalciferol, (CHOLECALCIFEROL) 10 MCG (400 UNIT) tablet, Take 400 Units by mouth Daily., Disp: , Rfl:    Allergies   Allergen Reactions   • Ppd [Tuberculin Purified Protein Derivative] Rash      Past Surgical History:   Procedure Laterality Date   • ACHILLES TENDON REPAIR Right    • ENDOSCOPY     • GYNECOLOGY EXAM UNDER ANESTHESIA N/A 2022    Procedure:  "GYNECOLOGY EXAM UNDER ANESTHESIA, REMOVAL OF CERVICAL LESION;  Surgeon: Adrian Branch MD;  Location: [facility];  Service: Obstetrics/Gynecology;  Laterality: N/A;   • REDUCTION MAMMAPLASTY  2000   • TONSILLECTOMY     • US GUIDED FINE NEEDLE ASPIRATION  01/05/2021    thyroid biopsy   • WISDOM TOOTH EXTRACTION  1995      Social History     Socioeconomic History   • Marital status:    Tobacco Use   • Smoking status: Never Smoker   • Smokeless tobacco: Never Used   Vaping Use   • Vaping Use: Never used   Substance and Sexual Activity   • Alcohol use: Yes     Alcohol/week: 2.0 standard drinks     Types: 2 Glasses of wine per week     Comment: On occasion - this is not every week nor in same week   • Drug use: No   • Sexual activity: Yes     Partners: Male     Birth control/protection: None     Comment: My  had a vasectomy      Family History   Problem Relation Age of Onset   • Thyroid disease Mother    • Hyperlipidemia Mother    • Breast cancer Maternal Aunt    • Cervical cancer Maternal Aunt    • Hypertension Father    • Hyperlipidemia Father    • Hyperlipidemia Maternal Grandmother    • Ovarian cancer Maternal Grandmother    • Rheum arthritis Other    • Cancer Other         colon ovarian and breast       Review of Systems   Constitutional: Negative for chills, diaphoresis and fever.   Gastrointestinal: Negative.    Genitourinary: Negative for difficulty urinating, dysuria, pelvic pain, vaginal bleeding and vaginal discharge.           Objective   /80   Ht 167.6 cm (66\")   Wt 83.4 kg (183 lb 12.8 oz)   LMP 07/13/2022 (Exact Date)   Breastfeeding No   BMI 29.67 kg/m²     Physical Exam  Constitutional:       Appearance: Normal appearance. She is well-developed and well-groomed.   Eyes:      General: Lids are normal.      Extraocular Movements: Extraocular movements intact.      Conjunctiva/sclera: Conjunctivae normal.   Skin:     General: Skin is warm and dry.      Findings: No bruising or " lesion.   Neurological:      Mental Status: She is alert.   Psychiatric:         Attention and Perception: Attention normal.         Mood and Affect: Mood normal.         Speech: Speech normal.         Behavior: Behavior is cooperative.            Result Review :                   Assessment and Plan  Diagnoses and all orders for this visit:    1. Postoperative follow-up (Primary)      Patient Instructions   RTO prn             This note was electronically signed.    Kristina Ford PA-C   July 22, 2022

## 2022-09-12 ENCOUNTER — APPOINTMENT (OUTPATIENT)
Dept: MAMMOGRAPHY | Facility: HOSPITAL | Age: 47
End: 2022-09-12

## 2022-09-21 ENCOUNTER — TRANSCRIBE ORDERS (OUTPATIENT)
Dept: ADMINISTRATIVE | Facility: HOSPITAL | Age: 47
End: 2022-09-21

## 2022-09-21 DIAGNOSIS — R06.02 SHORTNESS OF BREATH: ICD-10-CM

## 2022-09-21 DIAGNOSIS — M35.1 OTHER OVERLAP SYNDROMES: Primary | ICD-10-CM

## 2022-09-21 DIAGNOSIS — R53.83 OTHER FATIGUE: ICD-10-CM

## 2022-09-30 ENCOUNTER — HOSPITAL ENCOUNTER (OUTPATIENT)
Dept: CARDIOLOGY | Facility: HOSPITAL | Age: 47
Discharge: HOME | End: 2022-09-30
Admitting: NURSE PRACTITIONER

## 2022-09-30 VITALS — WEIGHT: 183 LBS | BODY MASS INDEX: 29.41 KG/M2 | HEIGHT: 66 IN

## 2022-09-30 DIAGNOSIS — R06.02 SHORTNESS OF BREATH: ICD-10-CM

## 2022-09-30 DIAGNOSIS — M35.1 OTHER OVERLAP SYNDROMES: ICD-10-CM

## 2022-09-30 DIAGNOSIS — R53.83 OTHER FATIGUE: ICD-10-CM

## 2022-09-30 LAB
BH CV ECHO MEAS - AO MAX PG: 5.4 MMHG
BH CV ECHO MEAS - AO MEAN PG: 3 MMHG
BH CV ECHO MEAS - AO ROOT DIAM: 3 CM
BH CV ECHO MEAS - AO V2 MAX: 116 CM/SEC
BH CV ECHO MEAS - AO V2 VTI: 20.7 CM
BH CV ECHO MEAS - AVA(I,D): 3.4 CM2
BH CV ECHO MEAS - EDV(CUBED): 101.8 ML
BH CV ECHO MEAS - EDV(MOD-SP2): 76.9 ML
BH CV ECHO MEAS - EDV(MOD-SP4): 81.7 ML
BH CV ECHO MEAS - EF(MOD-BP): 68.1 %
BH CV ECHO MEAS - EF(MOD-SP2): 73.3 %
BH CV ECHO MEAS - EF(MOD-SP4): 61.7 %
BH CV ECHO MEAS - ESV(CUBED): 33.7 ML
BH CV ECHO MEAS - ESV(MOD-SP2): 20.5 ML
BH CV ECHO MEAS - ESV(MOD-SP4): 31.3 ML
BH CV ECHO MEAS - FS: 30.8 %
BH CV ECHO MEAS - IVS/LVPW: 0.81 CM
BH CV ECHO MEAS - IVSD: 0.74 CM
BH CV ECHO MEAS - LA DIMENSION: 2.8 CM
BH CV ECHO MEAS - LAT PEAK E' VEL: 13.5 CM/SEC
BH CV ECHO MEAS - LV MASS(C)D: 125.9 GRAMS
BH CV ECHO MEAS - LV MAX PG: 3.4 MMHG
BH CV ECHO MEAS - LV MEAN PG: 2 MMHG
BH CV ECHO MEAS - LV V1 MAX: 91.6 CM/SEC
BH CV ECHO MEAS - LV V1 VTI: 18.1 CM
BH CV ECHO MEAS - LVIDD: 4.7 CM
BH CV ECHO MEAS - LVIDS: 3.2 CM
BH CV ECHO MEAS - LVOT AREA: 3.8 CM2
BH CV ECHO MEAS - LVOT DIAM: 2.21 CM
BH CV ECHO MEAS - LVPWD: 0.91 CM
BH CV ECHO MEAS - MED PEAK E' VEL: 7.6 CM/SEC
BH CV ECHO MEAS - MV A MAX VEL: 52.4 CM/SEC
BH CV ECHO MEAS - MV DEC SLOPE: 318 CM/SEC2
BH CV ECHO MEAS - MV DEC TIME: 0.22 MSEC
BH CV ECHO MEAS - MV E MAX VEL: 58.7 CM/SEC
BH CV ECHO MEAS - MV E/A: 1.12
BH CV ECHO MEAS - MV MAX PG: 2.23 MMHG
BH CV ECHO MEAS - MV MEAN PG: 1 MMHG
BH CV ECHO MEAS - MV P1/2T: 75 MSEC
BH CV ECHO MEAS - MV V2 VTI: 26.5 CM
BH CV ECHO MEAS - MVA(P1/2T): 2.9 CM2
BH CV ECHO MEAS - MVA(VTI): 2.6 CM2
BH CV ECHO MEAS - PA ACC TIME: 0.21 SEC
BH CV ECHO MEAS - PA PR(ACCEL): -14.6 MMHG
BH CV ECHO MEAS - PA V2 MAX: 77.5 CM/SEC
BH CV ECHO MEAS - PULM A REVS DUR: 0.15 SEC
BH CV ECHO MEAS - PULM A REVS VEL: 29.6 CM/SEC
BH CV ECHO MEAS - PULM DIAS VEL: 52.4 CM/SEC
BH CV ECHO MEAS - PULM S/D: 0.95
BH CV ECHO MEAS - PULM SYS VEL: 50 CM/SEC
BH CV ECHO MEAS - RAP SYSTOLE: 3 MMHG
BH CV ECHO MEAS - RV MAX PG: 1.65 MMHG
BH CV ECHO MEAS - RV V1 MAX: 64.3 CM/SEC
BH CV ECHO MEAS - RV V1 VTI: 16 CM
BH CV ECHO MEAS - SV(LVOT): 69.4 ML
BH CV ECHO MEAS - SV(MOD-SP2): 56.4 ML
BH CV ECHO MEAS - SV(MOD-SP4): 50.4 ML
BH CV ECHO MEAS - TAPSE (>1.6): 1.82 CM
BH CV ECHO MEASUREMENTS AVERAGE E/E' RATIO: 5.56
BH CV XLRA - RV BASE: 3.2 CM
BH CV XLRA - RV LENGTH: 6.7 CM
BH CV XLRA - RV MID: 1.99 CM
BH CV XLRA - TDI S': 9.5 CM/SEC
LEFT ATRIUM VOLUME INDEX: 19.8 ML/M2
LV EF 2D ECHO EST: 62 %
MAXIMAL PREDICTED HEART RATE: 175 BPM
STRESS TARGET HR: 149 BPM

## 2022-09-30 PROCEDURE — 93306 TTE W/DOPPLER COMPLETE: CPT | Performed by: INTERNAL MEDICINE

## 2022-09-30 PROCEDURE — 93306 TTE W/DOPPLER COMPLETE: CPT

## 2022-10-06 ENCOUNTER — OFFICE VISIT (OUTPATIENT)
Dept: FAMILY MEDICINE CLINIC | Facility: CLINIC | Age: 47
End: 2022-10-06

## 2022-10-06 VITALS — BODY MASS INDEX: 27.8 KG/M2 | HEART RATE: 60 BPM | OXYGEN SATURATION: 98 % | WEIGHT: 173 LBS | HEIGHT: 66 IN

## 2022-10-06 DIAGNOSIS — G47.01 INSOMNIA DUE TO MEDICAL CONDITION: ICD-10-CM

## 2022-10-06 DIAGNOSIS — M35.1 MIXED CONNECTIVE TISSUE DISEASE: Primary | ICD-10-CM

## 2022-10-06 DIAGNOSIS — K21.9 GASTROESOPHAGEAL REFLUX DISEASE WITHOUT ESOPHAGITIS: ICD-10-CM

## 2022-10-06 DIAGNOSIS — R73.01 IMPAIRED FASTING GLUCOSE: ICD-10-CM

## 2022-10-06 PROCEDURE — 99214 OFFICE O/P EST MOD 30 MIN: CPT | Performed by: FAMILY MEDICINE

## 2022-10-06 RX ORDER — DIAZEPAM 10 MG/1
5 TABLET ORAL NIGHTLY PRN
Qty: 30 TABLET | Refills: 3 | Status: SHIPPED | OUTPATIENT
Start: 2022-10-06

## 2022-10-06 RX ORDER — METFORMIN HYDROCHLORIDE 500 MG/1
500 TABLET, EXTENDED RELEASE ORAL
Qty: 90 TABLET | Refills: 3 | Status: SHIPPED | OUTPATIENT
Start: 2022-10-06

## 2022-10-06 RX ORDER — HYDROXYCHLOROQUINE SULFATE 200 MG/1
200 TABLET, FILM COATED ORAL 2 TIMES DAILY
Qty: 180 TABLET | Refills: 2 | Status: SHIPPED | OUTPATIENT
Start: 2022-10-06 | End: 2023-07-03

## 2022-10-06 RX ORDER — PANTOPRAZOLE SODIUM 20 MG/1
20 TABLET, DELAYED RELEASE ORAL DAILY
Qty: 90 TABLET | Refills: 3 | Status: SHIPPED | OUTPATIENT
Start: 2022-10-06

## 2022-10-06 RX ORDER — OMEPRAZOLE 40 MG/1
40 CAPSULE, DELAYED RELEASE ORAL DAILY
Qty: 90 CAPSULE | Refills: 3 | Status: CANCELLED | OUTPATIENT
Start: 2022-10-06

## 2022-10-06 NOTE — PROGRESS NOTES
Established Patient        Chief Complaint:   Chief Complaint   Patient presents with   • Follow-up     6 month follow up.        Jesica Green is a 47 y.o. female    History of Present Illness:   Answers for HPI/ROS submitted by the patient on 9/29/2022  Please describe your symptoms.: Regular check-up  Have you had these symptoms before?: Yes  How long have you been having these symptoms?: Greater than 2 weeks  Please list any medications you are currently taking for this condition.: On file  Please describe any probable cause for these symptoms. : MCTD  What is the primary reason for your visit?: Other    Here today in scheduled follow-up of her insomnia, impaired fasting glucose, GERD and mixed connective tissue disease.    Continues to be followed by rheumatology.  Has a planned surveillance echocardiogram, as well as routine lab work.  Has eye exams as per guidelines additionally.    Continues to see therapeutic benefit of current medication regimen.  She did make an effort to discontinue use of omeprazole, however her symptoms did shortly return.  She denies any aspiration/dysphagia.  Denies any BRB/BTS.    Denies fever, chills or night sweats.  Denies chest pain, syncope, palpitations or vertigo.  Maintains an active lifestyle, balanced dietary intake.    Subjective     The following portions of the patient's history were reviewed and updated as appropriate: allergies, current medications, past family history, past medical history, past social history, past surgical history and problem list.    Allergies   Allergen Reactions   • Ppd [Tuberculin Purified Protein Derivative] Rash       Review of Systems  1. Constitutional: Negative for fever. Negative for chills, diaphoresis.     2. HENT: No dysphagia; no changes to vision/hearing/taste; no epistaxis.  Chronic olfactory abnormality, slightly improved from last visit.  3. Eyes: Negative for redness and visual disturbance.   4. Respiratory: negative for  "shortness of breath. Negative for chest pain . Negative for cough and chest tightness.   5. Cardiovascular: Negative for chest pain and palpitations.   6. Gastrointestinal: no BRB/BTS; no aspiration.  7. Endocrine: Negative for cold intolerance and heat intolerance.   8. Genitourinary: Negative for difficulty urinating, dysuria and frequency.   9. Musculoskeletal: Chronic arthralgias/myalgia.  10. Skin: Negative for color change, rash and wound.   11. Neurological: Negative for syncope, weakness and headaches.   12. Hematological: Negative for adenopathy. Does not bruise/bleed easily.   13. Psychiatric/Behavioral: Negative for confusion. The patient is not nervous/anxious.  Sleep difficulties as per above.    Objective     Physical Exam   Vital Signs: Pulse 60   Ht 167.6 cm (65.98\")   Wt 78.5 kg (173 lb)   SpO2 98%   BMI 27.94 kg/m²     General Appearance: alert, oriented x 3, no acute distress.  Skin: warm and dry.   HEENT: Atraumatic.  pupils round and reactive to light and accommodation, oral mucosa pink and moist.  Nares patent without epistaxis.  External auditory canals are patent tympanic membranes intact.  Boggy/inflamed nasal turbinates; mild postnasal drainage without pustules or exudate.  Neck: supple, no JVD, trachea midline.  No thyromegaly.  Nodular change noted to the thyroid.  Lungs: CTA, unlabored breathing effort.  Heart: RRR, normal S1 and S2, no S3, no rub.  Abdomen: soft, non-tender, no palpable bladder, present bowel sounds to auscultation ×4.  No guarding or rigidity.  No CVA tenderness.  Extremities: no clubbing, cyanosis.  Good range of motion actively and passively.  Symmetric muscle strength and development.  Mild synovitis noted on palpation to interphalangeal joints of bilateral hands.  Symmetric  strength bilaterally.  Normal supination/pronation of bilateral forearms, as well as extension/flexion of bilateral elbows.  Normal deltoid tone and development.  No leg length " discrepancies.   Neuro: normal speech and mental status.  Cranial nerves II through XII intact.  No anosmia. DTR 2+; proprioception intact.  No focal motor/sensory deficits.        Assessment and Plan      Assessment:   Diagnoses and all orders for this visit:    1. Mixed connective tissue disease (HCC) (Primary)  -     hydroxychloroquine (PLAQUENIL) 200 MG tablet; Take 1 tablet by mouth 2 (Two) Times a Day for 270 days. Indications: Mixed Connective Tissue Disorder  Dispense: 180 tablet; Refill: 2    2. Insomnia due to medical condition  -     diazePAM (VALIUM) 10 MG tablet; Take 0.5 tablets by mouth At Night As Needed for Anxiety.  Dispense: 30 tablet; Refill: 3    3. Impaired fasting glucose  -     metFORMIN ER (GLUCOPHAGE-XR) 500 MG 24 hr tablet; Take 1 tablet by mouth Daily With Breakfast.  Dispense: 90 tablet; Refill: 3    4. Gastroesophageal reflux disease without esophagitis    Other orders  -     pantoprazole (Protonix) 20 MG EC tablet; Take 1 tablet by mouth Daily.  Dispense: 90 tablet; Refill: 3        Plan:  Has echocardiogram and PFT planned as per guidance of rheumatologist; keep scheduled f/u appt with rheumatology.    Dec PPI dosing; will change to tablet formulation also; will follow clinically.    VSS, BP @ goal.    Continue Metformin; will need surveillance BMP at f/u appt.    Discussed need for reduction in sodium/salt/caffeine intake; improve sleep habits as able; inc formal CV exercise program with goal of vigorous activity most, if not all, days of the week; goal of 250 min of sustained HR CV exercise total per week.      Discussion Summary:    Discussed plan of care in detail with pt today; pt verb understanding and agrees.    I spent 30 minutes caring for Jesica on this date of service. This time includes time spent by me in the following activities:preparing for the visit, performing a medically appropriate examination and/or evaluation , counseling and educating the  patient/family/caregiver, ordering medications, tests, or procedures, documenting information in the medical record and care coordination    I have reviewed and updated all copied forward information, as appropriate.  I attest to the accuracy and relevance of any unchanged information.    Follow up:  Return in about 6 months (around 4/6/2023) for Recheck.     There are no Patient Instructions on file for this visit.    Sarthak Patel DO  10/06/22  09:43 EDT          Please note that portions of this note may have been completed with a voice recognition program. Efforts were made to edit the dictations, but occasionally words are mistranscribed.

## 2022-10-10 ENCOUNTER — HOSPITAL ENCOUNTER (OUTPATIENT)
Dept: MAMMOGRAPHY | Facility: HOSPITAL | Age: 47
Discharge: HOME | End: 2022-10-10
Admitting: PHYSICIAN ASSISTANT

## 2022-10-10 DIAGNOSIS — Z12.31 ENCOUNTER FOR SCREENING MAMMOGRAM FOR MALIGNANT NEOPLASM OF BREAST: ICD-10-CM

## 2022-10-10 PROCEDURE — 77063 BREAST TOMOSYNTHESIS BI: CPT

## 2022-10-10 PROCEDURE — 77067 SCR MAMMO BI INCL CAD: CPT

## 2022-10-12 ENCOUNTER — TRANSCRIBE ORDERS (OUTPATIENT)
Dept: ADMINISTRATIVE | Facility: HOSPITAL | Age: 47
End: 2022-10-12

## 2023-02-08 DIAGNOSIS — R60.1 GENERALIZED EDEMA: ICD-10-CM

## 2023-02-08 RX ORDER — FUROSEMIDE 20 MG/1
20 TABLET ORAL EVERY MORNING
Qty: 90 TABLET | Refills: 3 | Status: SHIPPED | OUTPATIENT
Start: 2023-02-08

## 2023-02-08 RX ORDER — POTASSIUM CHLORIDE 750 MG/1
10 TABLET, FILM COATED, EXTENDED RELEASE ORAL DAILY
Qty: 90 TABLET | Refills: 3 | Status: SHIPPED | OUTPATIENT
Start: 2023-02-08

## 2023-05-02 ENCOUNTER — OFFICE VISIT (OUTPATIENT)
Dept: FAMILY MEDICINE CLINIC | Facility: CLINIC | Age: 48
End: 2023-05-02
Payer: COMMERCIAL

## 2023-05-02 VITALS
TEMPERATURE: 96.8 F | OXYGEN SATURATION: 97 % | WEIGHT: 185.6 LBS | DIASTOLIC BLOOD PRESSURE: 74 MMHG | BODY MASS INDEX: 29.83 KG/M2 | HEART RATE: 50 BPM | SYSTOLIC BLOOD PRESSURE: 126 MMHG | HEIGHT: 66 IN

## 2023-05-02 DIAGNOSIS — G47.01 INSOMNIA DUE TO MEDICAL CONDITION: ICD-10-CM

## 2023-05-02 DIAGNOSIS — M35.1 MIXED CONNECTIVE TISSUE DISEASE: ICD-10-CM

## 2023-05-02 DIAGNOSIS — G50.0 TRIGEMINAL NEURALGIA OF LEFT SIDE OF FACE: Primary | ICD-10-CM

## 2023-05-02 LAB
EXPIRATION DATE: NORMAL
HBA1C MFR BLD: 4.6 %
Lab: NORMAL

## 2023-05-02 RX ORDER — DULOXETIN HYDROCHLORIDE 30 MG/1
1 CAPSULE, DELAYED RELEASE ORAL DAILY
COMMUNITY
Start: 2023-04-22 | End: 2023-05-03 | Stop reason: SDUPTHER

## 2023-05-02 RX ORDER — CARBAMAZEPINE 100 MG/1
100 CAPSULE, EXTENDED RELEASE ORAL 2 TIMES DAILY
Qty: 60 CAPSULE | Refills: 2 | Status: SHIPPED | OUTPATIENT
Start: 2023-05-02

## 2023-05-02 RX ORDER — HYDROXYCHLOROQUINE SULFATE 200 MG/1
1 TABLET, FILM COATED ORAL EVERY 12 HOURS
COMMUNITY
Start: 2023-03-22

## 2023-05-02 RX ORDER — PANTOPRAZOLE SODIUM 20 MG/1
2 TABLET, DELAYED RELEASE ORAL EVERY 24 HOURS
COMMUNITY
End: 2023-05-03

## 2023-05-02 NOTE — PROGRESS NOTES
Established Patient        Chief Complaint:   Chief Complaint   Patient presents with   • Follow-up     6 mths        Jesica Green is a 48 y.o. female    History of Present Illness:   Here today in follow-up of her known insomnia, mixed connective tissue disease and newly diagnosed trigeminal neuralgia of the left side of the face.  She describes a dermatomal like distribution of shocklike discomfort and pain to her left face.  It affects areas and around the orbit, maxillary and mandibular face.  No overlying skin changes.  Diagnosed with trigeminal neuralgia by her rheumatologist.  They placed a referral to be seen by neurology, however that appointment will not be until later in November.  She was given a prescription to start duloxetine and treatment at this time.  She denies any fever, chills or night sweats.  No overlying skin changes.  No visual deficits.  Denies any ear drainage or loss of hearing.    Continues to have difficulty with sleep maintenance.    Subjective     The following portions of the patient's history were reviewed and updated as appropriate: allergies, current medications, past family history, past medical history, past social history, past surgical history and problem list.    Allergies   Allergen Reactions   • Ppd [Tuberculin Purified Protein Derivative] Rash       Review of Systems  1. Constitutional: Negative for fever. Negative for chills, diaphoresis.     2. HENT: No dysphagia; no changes to vision/hearing/taste; no epistaxis.  Chronic olfactory abnormality, slightly improved from last visit.  3. Eyes: Negative for redness and visual disturbance.   4. Respiratory: negative for shortness of breath. Negative for chest pain . Negative for cough and chest tightness.   5. Cardiovascular: Negative for chest pain and palpitations.   6. Gastrointestinal: no BRB/BTS; no aspiration.  7. Endocrine: Negative for cold intolerance and heat intolerance.   8. Genitourinary: Negative for  "difficulty urinating, dysuria and frequency.   9. Musculoskeletal: Chronic arthralgias/myalgia.  10. Skin: Negative for color change, rash and wound.   11. Neurological: Negative for syncope, weakness; left facial discomfort as per above, facial nerve branching.  12. Hematological: Negative for adenopathy. Does not bruise/bleed easily.   13. Psychiatric/Behavioral: Negative for confusion. The patient is not nervous/anxious.  Sleep difficulties as per above.    Objective     Physical Exam   Vital Signs: /74   Pulse 50   Temp 96.8 °F (36 °C)   Ht 167.6 cm (65.98\")   Wt 84.2 kg (185 lb 9.6 oz)   SpO2 97%   BMI 29.97 kg/m²     General Appearance: alert, oriented x 3, no acute distress.  Skin: warm and dry.   HEENT: Atraumatic.  pupils round and reactive to light and accommodation, oral mucosa pink and moist.  Nares patent without epistaxis.  External auditory canals are patent tympanic membranes intact.  Boggy/inflamed nasal turbinates; mild postnasal drainage without pustules or exudate.  Neck: supple, no JVD, trachea midline.  No thyromegaly.  Nodular change noted to the thyroid.  Lungs: CTA, unlabored breathing effort.  Heart: RRR, normal S1 and S2, no S3, no rub.  Abdomen: soft, non-tender, no palpable bladder, present bowel sounds to auscultation ×4.  No guarding or rigidity.  No CVA tenderness.  Extremities: no clubbing, cyanosis.  Good range of motion actively and passively.  Symmetric muscle strength and development.  Mild synovitis noted on palpation to interphalangeal joints of bilateral hands.  Symmetric  strength bilaterally.  Normal supination/pronation of bilateral forearms, as well as extension/flexion of bilateral elbows.  Normal deltoid tone and development.  No leg length discrepancies.   Neuro: normal speech and mental status.  Cranial nerves II through XII intact.  No anosmia. DTR 2+; proprioception intact.  No focal motor/sensory deficits.        Assessment and Plan      Assessment: "   Diagnoses and all orders for this visit:    1. Trigeminal neuralgia of left side of face (Primary)  -     carBAMazepine (CARBATROL) 100 MG 12 hr capsule; Take 1 capsule by mouth 2 (Two) Times a Day.  Dispense: 60 capsule; Refill: 2    2. Mixed connective tissue disease    3. Insomnia due to medical condition        Plan:     Reviewed most recent labs completed with rheumatology f/u visit.    Start carbamazepine; will follow clinically.  I have asked that she notify the office should she develop any ill effects to the new medication.    Do not start duloxetine; pt verb understanding; will hold off on neurology appt scheduled for late fall by rheumatology.    VSS, appears HD asymptomatic.    Refill hydroxychloroquine in the fall when needed.  Surveillance labs additionally.    Discussion Summary:    Discussed plan of care in detail with pt today; pt verb understanding and agrees.    I spent 30 minutes caring for Jesica on this date of service. This time includes time spent by me in the following activities:preparing for the visit, performing a medically appropriate examination and/or evaluation , counseling and educating the patient/family/caregiver, ordering medications, tests, or procedures, documenting information in the medical record and care coordination    I have reviewed and updated all copied forward information, as appropriate.  I attest to the accuracy and relevance of any unchanged information.    Follow up:  Return in about 6 months (around 11/2/2023) for Annual physical.     There are no Patient Instructions on file for this visit.    Sarthak Patel DO  05/02/23  16:41 EDT

## 2023-05-23 DIAGNOSIS — G47.01 INSOMNIA DUE TO MEDICAL CONDITION: ICD-10-CM

## 2023-05-23 NOTE — TELEPHONE ENCOUNTER
Rx Refill Note  Requested Prescriptions     Pending Prescriptions Disp Refills   • diazePAM (VALIUM) 10 MG tablet 30 tablet 3     Sig: Take 0.5 tablets by mouth At Night As Needed for Anxiety.      Last office visit with prescribing clinician: 5/2/2023   Last telemedicine visit with prescribing clinician: Visit date not found   Next office visit with prescribing clinician: 11/2/2023                         Would you like a call back once the refill request has been completed: [] Yes [] No    If the office needs to give you a call back, can they leave a voicemail: [] Yes [] No    Becky Odom MA  05/23/23, 09:27 EDT

## 2023-05-24 RX ORDER — DIAZEPAM 10 MG/1
5 TABLET ORAL NIGHTLY PRN
Qty: 30 TABLET | Refills: 3 | Status: SHIPPED | OUTPATIENT
Start: 2023-05-24

## 2023-07-24 ENCOUNTER — TRANSCRIBE ORDERS (OUTPATIENT)
Dept: ADMINISTRATIVE | Facility: HOSPITAL | Age: 48
End: 2023-07-24
Payer: COMMERCIAL

## 2023-07-24 DIAGNOSIS — R53.83 OTHER FATIGUE: ICD-10-CM

## 2023-07-24 DIAGNOSIS — M35.1 OTHER OVERLAP SYNDROMES: Primary | ICD-10-CM

## 2023-07-31 ENCOUNTER — HOSPITAL ENCOUNTER (OUTPATIENT)
Dept: PULMONOLOGY | Facility: HOSPITAL | Age: 48
Discharge: HOME | End: 2023-07-31
Admitting: NURSE PRACTITIONER
Payer: COMMERCIAL

## 2023-07-31 DIAGNOSIS — M35.1 OTHER OVERLAP SYNDROMES: ICD-10-CM

## 2023-07-31 PROCEDURE — 94726 PLETHYSMOGRAPHY LUNG VOLUMES: CPT

## 2023-07-31 PROCEDURE — 94726 PLETHYSMOGRAPHY LUNG VOLUMES: CPT | Performed by: INTERNAL MEDICINE

## 2023-07-31 PROCEDURE — 94010 BREATHING CAPACITY TEST: CPT | Performed by: INTERNAL MEDICINE

## 2023-07-31 PROCEDURE — 94729 DIFFUSING CAPACITY: CPT

## 2023-07-31 PROCEDURE — 94010 BREATHING CAPACITY TEST: CPT

## 2023-07-31 PROCEDURE — 94729 DIFFUSING CAPACITY: CPT | Performed by: INTERNAL MEDICINE

## 2023-11-02 ENCOUNTER — OFFICE VISIT (OUTPATIENT)
Dept: FAMILY MEDICINE CLINIC | Facility: CLINIC | Age: 48
End: 2023-11-02
Payer: COMMERCIAL

## 2023-11-02 VITALS
SYSTOLIC BLOOD PRESSURE: 126 MMHG | WEIGHT: 196 LBS | DIASTOLIC BLOOD PRESSURE: 84 MMHG | TEMPERATURE: 97 F | RESPIRATION RATE: 16 BRPM | HEART RATE: 74 BPM | BODY MASS INDEX: 31.5 KG/M2 | HEIGHT: 66 IN | OXYGEN SATURATION: 100 %

## 2023-11-02 DIAGNOSIS — M35.1 MIXED CONNECTIVE TISSUE DISEASE: Primary | ICD-10-CM

## 2023-11-02 DIAGNOSIS — G47.419 NARCOLEPSY WITHOUT CATAPLEXY: ICD-10-CM

## 2023-11-02 DIAGNOSIS — K21.9 GASTROESOPHAGEAL REFLUX DISEASE WITHOUT ESOPHAGITIS: ICD-10-CM

## 2023-11-02 DIAGNOSIS — M25.50 POLYARTHRALGIA: ICD-10-CM

## 2023-11-02 DIAGNOSIS — G47.01 INSOMNIA DUE TO MEDICAL CONDITION: ICD-10-CM

## 2023-11-02 RX ORDER — POTASSIUM CHLORIDE 750 MG/1
1 TABLET, FILM COATED, EXTENDED RELEASE ORAL DAILY
COMMUNITY
Start: 2023-08-12

## 2023-11-02 RX ORDER — VENLAFAXINE HYDROCHLORIDE 37.5 MG/1
37.5 CAPSULE, EXTENDED RELEASE ORAL DAILY
Qty: 90 CAPSULE | Refills: 1 | Status: SHIPPED | OUTPATIENT
Start: 2023-11-02

## 2023-11-02 NOTE — PROGRESS NOTES
Established Patient        Chief Complaint:   Chief Complaint   Patient presents with    Med Refill    Follow-up     routine        Jesica Green is a 48 y.o. female    History of Present Illness:   Here today in scheduled follow-up visit of her mixed connective tissue disorder, GERD, polyarthralgia, insomnia and newly developed excessive daytime drowsiness.    Denies chest pain, syncope, palpitations or vertigo.  Denies fever, chills or night sweats.  Maintains an active lifestyle, balanced dietary intake; reports good hydration habits.  Denies hematuria/dysuria.  Denies any BRB/BTS.  No new rashes.  Denies orthopnea, epistaxis or hemoptysis.    Diffuse myalgias at times, polyarthralgia of upper and lower extremities.    Patient does report some mood instability, easily irritated, as well as frustrated with trivial situations/issues at times.  Denies SI/HI.    Subjective     The following portions of the patient's history were reviewed and updated as appropriate: allergies, current medications, past family history, past medical history, past social history, past surgical history and problem list.    Allergies   Allergen Reactions    Ppd [Tuberculin Purified Protein Derivative] Rash       Review of Systems  Constitutional: Negative for fever. Negative for chills, diaphoresis.   Excessive daytime drowsiness as per above.  HENT: No dysphagia; no changes to vision/hearing/taste; no epistaxis.  Chronic olfactory abnormality, slightly improved from last visit.  Eyes: Negative for redness and visual disturbance.   Respiratory: negative for shortness of breath. Negative for chest pain . Negative for cough and chest tightness.   Cardiovascular: Negative for chest pain and palpitations.   Gastrointestinal: no BRB/BTS; no aspiration.  Endocrine: Negative for cold intolerance and heat intolerance.   Genitourinary: Negative for difficulty urinating, dysuria and frequency.   Musculoskeletal: Chronic  "arthralgias/myalgia.  Skin: Negative for color change, rash and wound.   Neurological: Negative for syncope, weakness; left facial discomfort as per above, facial nerve branching.  Hematological: Negative for adenopathy. Does not bruise/bleed easily.   Psychiatric/Behavioral: Negative for confusion. The patient is not nervous/anxious.  Sleep difficulties as per above.    Objective     Physical Exam   Vital Signs: /84   Pulse 74   Temp 97 °F (36.1 °C)   Resp 16   Ht 167.6 cm (66\")   Wt 88.9 kg (196 lb)   SpO2 100%   BMI 31.64 kg/m²     General Appearance: alert, oriented x 3, no acute distress.  Skin: warm and dry.   HEENT: Atraumatic.  pupils round and reactive to light and accommodation, oral mucosa pink and moist.  Nares patent without epistaxis.  External auditory canals are patent tympanic membranes intact.  Boggy/inflamed nasal turbinates; mild postnasal drainage without pustules or exudate.  Neck: supple, no JVD, trachea midline.  No thyromegaly.  Nodular change noted to the thyroid.  Lungs: CTA, unlabored breathing effort.  Heart: RRR, normal S1 and S2, no S3, no rub.  Abdomen: soft, non-tender, no palpable bladder, present bowel sounds to auscultation ×4.  No guarding or rigidity.  No CVA tenderness.  Extremities: no clubbing, cyanosis.  Good range of motion actively and passively.  Symmetric muscle strength and development.  Mild synovitis noted on palpation to interphalangeal joints of bilateral hands.  Symmetric  strength bilaterally.  Normal supination/pronation of bilateral forearms, as well as extension/flexion of bilateral elbows.  Normal deltoid tone and development.  No leg length discrepancies.   Neuro: normal speech and mental status.  Cranial nerves II through XII intact.  No anosmia. DTR 2+; proprioception intact.  No focal motor/sensory deficits.        Assessment and Plan      Assessment:   Diagnoses and all orders for this visit:    1. Mixed connective tissue disease " (Primary)  -     CBC & Differential  -     Comprehensive Metabolic Panel    2. Gastroesophageal reflux disease without esophagitis    3. Polyarthralgia    4. Insomnia due to medical condition    5. Narcolepsy without cataplexy    Other orders  -     venlafaxine XR (Effexor XR) 37.5 MG 24 hr capsule; Take 1 capsule by mouth Daily.  Dispense: 90 capsule; Refill: 1        Plan:     Begin use of once daily mood stabilizer.  I have asked the patient notify the office should she develop any ill effects to the new medication.    Discussed need for stress/anxiety reducing techniques such as prayer/meditation/breathing and counting exercises and avoidance of stress producing environments/situations; will follow clinically.    Continue use of Plaquenil.  Surveillance labs when needed.    Surveillance CBC/CMP today.    Discussion Summary:    Discussed plan of care in detail with pt today; pt verb understanding and agrees.    I spent 30 minutes caring for Jesica on this date of service. This time includes time spent by me in the following activities:preparing for the visit, performing a medically appropriate examination and/or evaluation , counseling and educating the patient/family/caregiver, ordering medications, tests, or procedures, documenting information in the medical record, and care coordination    I have reviewed and updated all copied forward information, as appropriate.  I attest to the accuracy and relevance of any unchanged information.    Follow up:  Return in about 6 weeks (around 12/14/2023) for Recheck, Med Change/New Meds.     There are no Patient Instructions on file for this visit.    Sarthak Patel,   11/14/23  22:24 EST

## 2023-11-03 LAB
ALBUMIN SERPL-MCNC: 4.8 G/DL (ref 3.5–5.2)
ALBUMIN/GLOB SERPL: 2.5 G/DL
ALP SERPL-CCNC: 41 U/L (ref 39–117)
ALT SERPL-CCNC: 19 U/L (ref 1–33)
AST SERPL-CCNC: 18 U/L (ref 1–32)
BASOPHILS # BLD AUTO: 0.03 10*3/MM3 (ref 0–0.2)
BASOPHILS NFR BLD AUTO: 0.6 % (ref 0–1.5)
BILIRUB SERPL-MCNC: 0.3 MG/DL (ref 0–1.2)
BUN SERPL-MCNC: 13 MG/DL (ref 6–20)
BUN/CREAT SERPL: 16.5 (ref 7–25)
CALCIUM SERPL-MCNC: 10.3 MG/DL (ref 8.6–10.5)
CHLORIDE SERPL-SCNC: 105 MMOL/L (ref 98–107)
CO2 SERPL-SCNC: 26.8 MMOL/L (ref 22–29)
CREAT SERPL-MCNC: 0.79 MG/DL (ref 0.57–1)
EGFRCR SERPLBLD CKD-EPI 2021: 93.6 ML/MIN/1.73
EOSINOPHIL # BLD AUTO: 0.06 10*3/MM3 (ref 0–0.4)
EOSINOPHIL NFR BLD AUTO: 1.2 % (ref 0.3–6.2)
ERYTHROCYTE [DISTWIDTH] IN BLOOD BY AUTOMATED COUNT: 12.1 % (ref 12.3–15.4)
GLOBULIN SER CALC-MCNC: 1.9 GM/DL
GLUCOSE SERPL-MCNC: 91 MG/DL (ref 65–99)
HCT VFR BLD AUTO: 40.3 % (ref 34–46.6)
HGB BLD-MCNC: 13.4 G/DL (ref 12–15.9)
IMM GRANULOCYTES # BLD AUTO: 0.02 10*3/MM3 (ref 0–0.05)
IMM GRANULOCYTES NFR BLD AUTO: 0.4 % (ref 0–0.5)
LYMPHOCYTES # BLD AUTO: 1.7 10*3/MM3 (ref 0.7–3.1)
LYMPHOCYTES NFR BLD AUTO: 33 % (ref 19.6–45.3)
MCH RBC QN AUTO: 30.6 PG (ref 26.6–33)
MCHC RBC AUTO-ENTMCNC: 33.3 G/DL (ref 31.5–35.7)
MCV RBC AUTO: 92 FL (ref 79–97)
MONOCYTES # BLD AUTO: 0.37 10*3/MM3 (ref 0.1–0.9)
MONOCYTES NFR BLD AUTO: 7.2 % (ref 5–12)
NEUTROPHILS # BLD AUTO: 2.97 10*3/MM3 (ref 1.7–7)
NEUTROPHILS NFR BLD AUTO: 57.6 % (ref 42.7–76)
NRBC BLD AUTO-RTO: 0 /100 WBC (ref 0–0.2)
PLATELET # BLD AUTO: 232 10*3/MM3 (ref 140–450)
POTASSIUM SERPL-SCNC: 5.4 MMOL/L (ref 3.5–5.2)
PROT SERPL-MCNC: 6.7 G/DL (ref 6–8.5)
RBC # BLD AUTO: 4.38 10*6/MM3 (ref 3.77–5.28)
SODIUM SERPL-SCNC: 138 MMOL/L (ref 136–145)
WBC # BLD AUTO: 5.15 10*3/MM3 (ref 3.4–10.8)

## 2023-11-05 DIAGNOSIS — R73.01 IMPAIRED FASTING GLUCOSE: ICD-10-CM

## 2023-11-06 RX ORDER — PANTOPRAZOLE SODIUM 20 MG/1
20 TABLET, DELAYED RELEASE ORAL DAILY
Qty: 90 TABLET | Refills: 3 | Status: SHIPPED | OUTPATIENT
Start: 2023-11-06

## 2023-11-06 RX ORDER — METFORMIN HYDROCHLORIDE 500 MG/1
500 TABLET, EXTENDED RELEASE ORAL
Qty: 90 TABLET | Refills: 3 | Status: SHIPPED | OUTPATIENT
Start: 2023-11-06

## 2023-11-27 ENCOUNTER — OFFICE VISIT (OUTPATIENT)
Dept: NEUROLOGY | Facility: CLINIC | Age: 48
End: 2023-11-27
Payer: COMMERCIAL

## 2023-11-27 VITALS
SYSTOLIC BLOOD PRESSURE: 140 MMHG | TEMPERATURE: 98.4 F | WEIGHT: 202 LBS | HEART RATE: 74 BPM | BODY MASS INDEX: 32.47 KG/M2 | HEIGHT: 66 IN | DIASTOLIC BLOOD PRESSURE: 82 MMHG | OXYGEN SATURATION: 100 %

## 2023-11-27 DIAGNOSIS — G43.909 ACUTE MIGRAINE: Primary | ICD-10-CM

## 2023-11-27 DIAGNOSIS — M79.2 NEURALGIA: ICD-10-CM

## 2023-11-27 DIAGNOSIS — H57.12 LEFT EYE PAIN: ICD-10-CM

## 2023-11-27 PROCEDURE — 99204 OFFICE O/P NEW MOD 45 MIN: CPT | Performed by: NURSE PRACTITIONER

## 2023-11-27 RX ORDER — GABAPENTIN 100 MG/1
100 CAPSULE ORAL 3 TIMES DAILY
Qty: 90 CAPSULE | Refills: 2 | Status: SHIPPED | OUTPATIENT
Start: 2023-11-27

## 2023-11-27 RX ORDER — INDOMETHACIN 50 MG/1
50 CAPSULE ORAL 2 TIMES DAILY PRN
Qty: 60 CAPSULE | Refills: 2 | Status: SHIPPED | OUTPATIENT
Start: 2023-11-27

## 2023-11-27 NOTE — PROGRESS NOTES
"     New Patient Office Visit      Patient Name: Jesica Green  : 1975   MRN: [id number]     Referring Physician: Amanda Miguel*    Chief Complaint:    Chief Complaint   Patient presents with    Establish Care     Migraine; patient reports 10/30 migraine days; patient has only been RX'd Venlafaxine however has not seen a difference in symptoms; patient reports light sensitivity.  She also reports that occasionally symptoms are triggered by applying make up on left side of face; also reports lip tingling; last eye exam 2023       History of Present Illness: Jesica Green is a 48 y.o. female who is here today to establish care with Neurology.   She reports she has had migraines since 2019 she says she has mixed connective tissue dz and says she started having a lot of migraines at this time and jaw pain. She had seen dentist and wore mouth guard for grinding her teeth. She says she has tingling in her hands and feet and pain in her hands and feet.   Pressure behind left eye that is constant and waxes and wanes. She says she has bouts of pain in the left jaw that extends to her ear area and says touching near her ear will at times cause shooting pain. This is abrupt and describes as shooting or shocking pain. She says this pain is quick.  She says she always has jaw pain and does not think that chewing or brushing her teeth affects this.   She says she will see spots, have nausea and has to \"stay in the dark\", + photophobia the pain involves the left face, ear area and jaw. Her migraine pain can last up to 2 days but usually it will be hours. She has some tearing in her left eye with the pain. She says she has used Excedrin migraine in the past but it has stopped working.     She was given Rx for Carbamazepine and says she did not start this. She does not like taking medications.     Pertinent Medical History: Mixed connective tissue dz, Fibromyalgia- followed by Rheumatology  Denies any " history of STDs     Subjective      Review of Systems:   Review of Systems   Eyes:  Positive for photophobia and visual disturbance.   Neurological:  Positive for numbness and headache.       Past Medical History:   Past Medical History:   Diagnosis Date    Acid reflux     Anemia     Asthma     Cervical lesion 07/06/2022    COVID-19 vaccine series completed     History of Papanicolaou smear of cervix 2015    Lupus     MCTD (mixed connective tissue disease)     Positive PPD     Varicella        Past Surgical History:   Past Surgical History:   Procedure Laterality Date    ACHILLES TENDON REPAIR Right     ENDOSCOPY      GYNECOLOGY EXAM UNDER ANESTHESIA N/A 7/12/2022    Procedure: GYNECOLOGY EXAM UNDER ANESTHESIA, REMOVAL OF CERVICAL LESION;  Surgeon: Adrian Branch MD;  Location: [facility];  Service: Obstetrics/Gynecology;  Laterality: N/A;    REDUCTION MAMMAPLASTY  2000    TONSILLECTOMY      US GUIDED FINE NEEDLE ASPIRATION  01/05/2021    thyroid biopsy    WISDOM TOOTH EXTRACTION  1995       Family History:   Family History   Problem Relation Age of Onset    Thyroid disease Mother     Hyperlipidemia Mother     Breast cancer Maternal Aunt     Cervical cancer Maternal Aunt     Hypertension Father     Hyperlipidemia Father     Hyperlipidemia Maternal Grandmother     Ovarian cancer Maternal Grandmother     Rheum arthritis Other     Cancer Other         colon ovarian and breast       Social History:   Social History     Socioeconomic History    Marital status:    Tobacco Use    Smoking status: Never    Smokeless tobacco: Never   Vaping Use    Vaping Use: Never used   Substance and Sexual Activity    Alcohol use: Yes     Alcohol/week: 2.0 standard drinks of alcohol     Types: 2 Glasses of wine per week     Comment: On occasion - this is not every week nor in same week    Drug use: No    Sexual activity: Yes     Partners: Male     Birth control/protection: None     Comment: My  had a vasectomy  "      Medications:     Current Outpatient Medications:     Biotin 1000 MCG chewable tablet, Chew., Disp: , Rfl:     diazePAM (VALIUM) 10 MG tablet, Take 0.5 tablets by mouth At Night As Needed for Anxiety., Disp: 30 tablet, Rfl: 3    furosemide (LASIX) 20 MG tablet, TAKE 1 TABLET BY MOUTH EVERY MORNING, Disp: 90 tablet, Rfl: 3    hydroxychloroquine (PLAQUENIL) 200 MG tablet, Take 1 tablet by mouth Every 12 (Twelve) Hours., Disp: , Rfl:     pantoprazole (PROTONIX) 20 MG EC tablet, TAKE 1 TABLET BY MOUTH DAILY, Disp: 90 tablet, Rfl: 3    potassium chloride 10 MEQ CR tablet, Take 1 tablet by mouth Daily., Disp: , Rfl:     venlafaxine XR (Effexor XR) 37.5 MG 24 hr capsule, Take 1 capsule by mouth Daily., Disp: 90 capsule, Rfl: 1    vitamin B-12 (CYANOCOBALAMIN) 1000 MCG tablet, Take 1 tablet by mouth Daily., Disp: , Rfl:     Vitamin D, Cholecalciferol, (CHOLECALCIFEROL) 10 MCG (400 UNIT) tablet, Take 1 tablet by mouth Daily., Disp: , Rfl:     gabapentin (Neurontin) 100 MG capsule, Take 1 capsule by mouth 3 (Three) Times a Day., Disp: 90 capsule, Rfl: 2    indomethacin (INDOCIN) 50 MG capsule, Take 1 capsule by mouth 2 (Two) Times a Day As Needed (migraine)., Disp: 60 capsule, Rfl: 2    Allergies:   Allergies   Allergen Reactions    Ppd [Tuberculin Purified Protein Derivative] Rash       Objective     Physical Exam:  Vital Signs:   Vitals:    11/27/23 0842   BP: 140/82   Pulse: 74   Temp: 98.4 °F (36.9 °C)   SpO2: 100%   Weight: 91.6 kg (202 lb)   Height: 167.6 cm (66\")   PainSc:   4   PainLoc: Head     Body mass index is 32.6 kg/m².     Physical Exam  Constitutional:       Appearance: Normal appearance.   HENT:      Head: Normocephalic.   Eyes:      Extraocular Movements: EOM normal.      Conjunctiva/sclera: Conjunctivae normal.      Pupils: Pupils are equal, round, and reactive to light.   Pulmonary:      Effort: Pulmonary effort is normal.   Musculoskeletal:         General: Normal range of motion.   Skin:     " General: Skin is warm and dry.   Neurological:      General: No focal deficit present.      Mental Status: She is alert and oriented to person, place, and time.      Cranial Nerves: Cranial nerves 2-12 are intact. No dysarthria.      Motor: Motor strength is normal.Motor function is intact. No tremor or pronator drift.      Coordination: Coordination is intact. Romberg sign negative. Finger-Nose-Finger Test and Romberg Test normal. Rapid alternating movements normal.      Gait: Gait is intact. Tandem walk normal.      Deep Tendon Reflexes:      Reflex Scores:       Bicep reflexes are 2+ on the right side and 2+ on the left side.       Brachioradialis reflexes are 2+ on the right side and 2+ on the left side.       Patellar reflexes are 2+ on the right side and 2+ on the left side.  Psychiatric:         Attention and Perception: Attention normal.         Mood and Affect: Mood and affect normal.         Speech: Speech normal.         Behavior: Behavior normal. Behavior is cooperative.         Thought Content: Thought content normal.         Cognition and Memory: Cognition normal.         Judgment: Judgment normal.         Neurologic Exam     Mental Status   Oriented to person, place, and time.   Speech: speech is normal   Level of consciousness: alert  Knowledge: good.     Cranial Nerves   Cranial nerves II through XII intact.     CN II   Visual fields full to confrontation.     CN III, IV, VI   Pupils are equal, round, and reactive to light.  Extraocular motions are normal.   CN III: no CN III palsy  CN VI: no CN VI palsy  Nystagmus: none     CN V   Facial sensation intact.     CN VIII   CN VIII normal.     CN IX, X   CN IX normal.   CN X normal.     CN XI   CN XI normal.     CN XII   CN XII normal.   Tongue: not atrophic  Fasciculations: absent  Tongue deviation: none    Motor Exam   Muscle bulk: normal  Overall muscle tone: normal  Right arm pronator drift: absent  Left arm pronator drift: absent    Strength    Strength 5/5 throughout.   Right deltoid: 5/5  Left deltoid: 5/5  Right biceps: 5/5  Left biceps: 5/5  Right triceps: 5/5  Left triceps: 5/5  Right quadriceps: 5/5  Left quadriceps: 5/5  Right hamstrin/5  Left hamstrin/5  Right anterior tibial: 5/5  Left anterior tibial: 5/5  Right posterior tibial: 5/5  Left posterior tibial: 5/5    Sensory Exam   Light touch normal.     Gait, Coordination, and Reflexes     Gait  Gait: normal    Coordination   Romberg: negative  Finger to nose coordination: normal  Tandem walking coordination: normal    Tremor   Resting tremor: absent  Intention tremor: absent  Action tremor: absent    Reflexes   Reflexes 2+ except as noted.   Right brachioradialis: 2+  Left brachioradialis: 2+  Right biceps: 2+  Left biceps: 2+  Right patellar: 2+  Left patellar: 2+      Assessment / Plan      Assessment/Plan:   Diagnoses and all orders for this visit:    1. Acute migraine (Primary)  -     indomethacin (INDOCIN) 50 MG capsule; Take 1 capsule by mouth 2 (Two) Times a Day As Needed (migraine).  Dispense: 60 capsule; Refill: 2  -     MRI Brain With & Without Contrast; Future    2. Neuralgia  -     MRI Brain With & Without Contrast; Future  -     gabapentin (Neurontin) 100 MG capsule; Take 1 capsule by mouth 3 (Three) Times a Day.  Dispense: 90 capsule; Refill: 2    3. Left eye pain  -     MRI Brain With & Without Contrast; Future       This is a pleasant 48-year-old patient here to establish care for migraine headaches.  She has been having left-sided facial migraines complicated by shooting and stabbing pain in the left face and ear.  She also reports some paresthesias in her hands and feet.  MRI with contrast has been ordered to rule out any demyelinating disease as well as consideration of vascular or structural abnormality which may be contributing to her symptoms.  Patient is on pantoprazole for some occasional heartburn.  Denies any history of gastritis or gastric ulcers.  Trial dose  of indomethacin 50 mg has been given along with gabapentin 100 mg 3 times daily which patient will titrate up to.  She is currently on venlafaxine and just started on this for her migraines by her PCP.  Will treat patient for acute migraine with differentials of trigeminal neuralgia, TAC headaches/SUNCT/RADHA.  Indications and side effects discussed with patient she verbalizes understanding.  Patient will call in the interim if she has any questions or concerns or changes.    Follow Up:   Return in about 3 months (around 2/27/2024).    ALEX Jama, FNP-[facility]

## 2023-12-04 ENCOUNTER — PATIENT ROUNDING (BHMG ONLY) (OUTPATIENT)
Dept: NEUROLOGY | Facility: CLINIC | Age: 48
End: 2023-12-04
Payer: COMMERCIAL

## 2023-12-12 ENCOUNTER — OFFICE VISIT (OUTPATIENT)
Dept: FAMILY MEDICINE CLINIC | Facility: CLINIC | Age: 48
End: 2023-12-12
Payer: COMMERCIAL

## 2023-12-12 VITALS
TEMPERATURE: 97 F | HEART RATE: 78 BPM | SYSTOLIC BLOOD PRESSURE: 124 MMHG | DIASTOLIC BLOOD PRESSURE: 76 MMHG | BODY MASS INDEX: 32.95 KG/M2 | WEIGHT: 205 LBS | OXYGEN SATURATION: 99 % | RESPIRATION RATE: 16 BRPM | HEIGHT: 66 IN

## 2023-12-12 DIAGNOSIS — F50.81 BINGE EATING DISORDER: ICD-10-CM

## 2023-12-12 DIAGNOSIS — M35.1 MIXED CONNECTIVE TISSUE DISEASE: ICD-10-CM

## 2023-12-12 DIAGNOSIS — M25.50 POLYARTHRALGIA: ICD-10-CM

## 2023-12-12 DIAGNOSIS — K21.9 GASTROESOPHAGEAL REFLUX DISEASE WITHOUT ESOPHAGITIS: Primary | ICD-10-CM

## 2023-12-12 DIAGNOSIS — F90.0 ADHD, PREDOMINANTLY INATTENTIVE TYPE: ICD-10-CM

## 2023-12-12 RX ORDER — LISDEXAMFETAMINE DIMESYLATE CAPSULES 20 MG/1
20 CAPSULE ORAL EVERY MORNING
Qty: 30 CAPSULE | Refills: 0 | Status: SHIPPED | OUTPATIENT
Start: 2023-12-12 | End: 2023-12-20 | Stop reason: SDUPTHER

## 2023-12-12 NOTE — PROGRESS NOTES
Established Patient        Chief Complaint:   Chief Complaint   Patient presents with    Follow-up     routine    Med Refill        Jesica Green is a 48 y.o. female    History of Present Illness:   Answers submitted by the patient for this visit:  Other (Submitted on 12/11/2023)  Please describe your symptoms.: Schedules follow-up  Have you had these symptoms before?: Yes  How long have you been having these symptoms?: Greater than 2 weeks  Please list any medications you are currently taking for this condition.: Same as before  Please describe any probable cause for these symptoms. : Same as before  Primary Reason for Visit (Submitted on 12/11/2023)  What is the primary reason for your visit?: Other      Subjective     The following portions of the patient's history were reviewed and updated as appropriate: allergies, current medications, past family history, past medical history, past social history, past surgical history and problem list.    Allergies   Allergen Reactions    Ppd [Tuberculin Purified Protein Derivative] Rash       Review of Systems  Constitutional: Negative for fever. Negative for chills, diaphoresis, fatigue and unexpected weight change.   HENT: No dysphagia; no changes to vision/hearing/smell/taste; no epistaxis  Eyes: Negative for redness and visual disturbance.   Respiratory: negative for shortness of breath. Negative for chest pain . Negative for cough and chest tightness.   Cardiovascular: Negative for chest pain and palpitations.   Gastrointestinal: Negative for abdominal distention, abdominal pain and blood in stool.   Endocrine: Negative for cold intolerance and heat intolerance.   Genitourinary: Negative for difficulty urinating, dysuria and frequency.   Musculoskeletal: Chronic arthralgias, back pain and myalgias.   Skin: Negative for color change, rash and wound.   Neurological: Negative for syncope, weakness and headaches.   Hematological: Negative for  "adenopathy. Does not bruise/bleed easily.   Psychiatric/Behavioral: Negative for confusion. The patient is not nervous/anxious.    Objective     Physical Exam   Vital Signs: /76   Pulse 78   Temp 97 °F (36.1 °C)   Resp 16   Ht 167.6 cm (65.98\")   Wt 93 kg (205 lb)   SpO2 99%   BMI 33.10 kg/m²   BMI is >= 30 and <35. (Class 1 Obesity). The following options were offered after discussion;: exercise counseling/recommendations and nutrition counseling/recommendations  Patient's (Body mass index is 33.1 kg/m².) indicates that they are obese (BMI >30) with health related conditions that include GERD and osteoarthritis . Weight is unchanged. BMI is is above average; BMI management plan is completed. We discussed portion control and increasing exercise.      General Appearance: alert, oriented x 3, no acute distress.  Skin: warm and dry.   HEENT: Atraumatic.  pupils round and reactive to light and accommodation, oral mucosa pink and moist.  Nares patent without epistaxis.  External auditory canals are patent tympanic membranes intact.  Neck: supple, no JVD, trachea midline.  No thyromegaly  Lungs: CTA, unlabored breathing effort.  Heart: RRR, normal S1 and S2, no S3, no rub.  Abdomen: soft, non-tender, no palpable bladder, present bowel sounds to auscultation ×4.  No guarding or rigidity.  Extremities: no clubbing, cyanosis or edema.  Good range of motion actively and passively.  Symmetric muscle strength and development  Neuro: normal speech and mental status.  Cranial nerves II through XII intact.  No anosmia. DTR 2+; proprioception intact.  No focal motor/sensory deficits.    Advance Care Planning   ACP discussion was held with the patient during this visit. Patient does not have an advance directive, information provided.       Assessment and Plan      Assessment/Plan:   Diagnoses and all orders for this visit:    1. Gastroesophageal reflux disease without esophagitis (Primary)    2. Mixed connective tissue " disease    3. Polyarthralgia    4. ADHD, predominantly inattentive type  -     lisdexamfetamine (Vyvanse) 20 MG capsule; Take 1 capsule by mouth Every Morning  Dispense: 30 capsule; Refill: 0    5. Binge eating disorder  -     lisdexamfetamine (Vyvanse) 20 MG capsule; Take 1 capsule by mouth Every Morning  Dispense: 30 capsule; Refill: 0      Begin trial of low-dose Vyvanse.    Keep scheduled follow-up appointment with rheumatology.  Continue current dosing of hydroxychloroquine.  Plan surveillance labs when needed.    Continue PPI therapy, as well as dietary/lifestyle modifications to aid in symptom management of chronic GERD.    Vital signs demonstrate hemodynamic stability.    Discussed need for reduction in sodium/salt/caffeine intake; improve sleep habits as able; inc formal CV exercise program with goal of vigorous activity most, if not all, days of the week; goal of 150 min of sustained HR CV exercise total per week.        Discussion Summary:    Discussed plan of care in detail with pt today; pt verb understanding and agrees.    I spent 30 minutes caring for Jesica on this date of service. This time includes time spent by me in the following activities:preparing for the visit, performing a medically appropriate examination and/or evaluation , counseling and educating the patient/family/caregiver, ordering medications, tests, or procedures, documenting information in the medical record, and care coordination    I have reviewed and updated all copied forward information, as appropriate.  I attest to the accuracy and relevance of any unchanged information.    Follow up:  No follow-ups on file.     There are no Patient Instructions on file for this visit.    Sarthak Patel DO  12/12/23  10:37 EST

## 2023-12-19 ENCOUNTER — TELEPHONE (OUTPATIENT)
Dept: FAMILY MEDICINE CLINIC | Facility: CLINIC | Age: 48
End: 2023-12-19
Payer: COMMERCIAL

## 2023-12-19 DIAGNOSIS — F90.0 ADHD, PREDOMINANTLY INATTENTIVE TYPE: ICD-10-CM

## 2023-12-19 DIAGNOSIS — F50.81 BINGE EATING DISORDER: ICD-10-CM

## 2023-12-19 RX ORDER — LISDEXAMFETAMINE DIMESYLATE CAPSULES 20 MG/1
20 CAPSULE ORAL EVERY MORNING
Qty: 90 CAPSULE | Refills: 0 | Status: CANCELLED | OUTPATIENT
Start: 2023-12-19

## 2023-12-19 NOTE — TELEPHONE ENCOUNTER
Would you be ok with this?    I know we normally do not send controlled medications to mail order pharmacies.

## 2023-12-19 NOTE — TELEPHONE ENCOUNTER
Caller: Jesica Green Onofre    Relationship: Self    Best call back number: [phone]    Requested Prescriptions: 90 DAY SUPPLY AND PHARMACY CHANGE TO GET CHEAPER PLEASE  Requested Prescriptions     Pending Prescriptions Disp Refills    lisdexamfetamine (Vyvanse) 20 MG capsule 30 capsule 0     Sig: Take 1 capsule by mouth Every Morning        Pharmacy where request should be sent: [facility] [address] [phone] [facility] [phone]      Last office visit with prescribing clinician: 12/12/2023   Last telemedicine visit with prescribing clinician: Visit date not found   Next office visit with prescribing clinician: 1/23/2024     Additional details provided by patient: 90 DAY SUPPLY AND PHARMACY CHANGE TO GET CHEAPER PLEASE    Does the patient have less than a 3 day supply:  [x] Yes  [] No    Would you like a call back once the refill request has been completed: [x] Yes [] No    If the office needs to give you a call back, can they leave a voicemail: [x] Yes [] No    Raine Grimaldo Rep   12/19/23 15:16 EST

## 2023-12-20 DIAGNOSIS — F50.81 BINGE EATING DISORDER: ICD-10-CM

## 2023-12-20 DIAGNOSIS — F90.0 ADHD, PREDOMINANTLY INATTENTIVE TYPE: ICD-10-CM

## 2023-12-20 RX ORDER — LISDEXAMFETAMINE DIMESYLATE CAPSULES 20 MG/1
20 CAPSULE ORAL EVERY MORNING
Qty: 90 CAPSULE | Refills: 0 | Status: SHIPPED | OUTPATIENT
Start: 2023-12-20 | End: 2024-03-19

## 2023-12-21 NOTE — TELEPHONE ENCOUNTER
Per Dr. Patel:   I sent the new Rx to [facility].    D       Patient has been notified via [employer].

## 2023-12-26 PROBLEM — F90.0 ADHD, PREDOMINANTLY INATTENTIVE TYPE: Status: ACTIVE | Noted: 2023-12-26

## 2023-12-26 PROBLEM — F50.81 BINGE EATING DISORDER: Status: ACTIVE | Noted: 2023-12-26

## 2024-02-03 DIAGNOSIS — R60.1 GENERALIZED EDEMA: ICD-10-CM

## 2024-02-06 RX ORDER — FUROSEMIDE 20 MG/1
20 TABLET ORAL EVERY MORNING
Qty: 90 TABLET | Refills: 3 | Status: SHIPPED | OUTPATIENT
Start: 2024-02-06

## 2024-02-06 RX ORDER — POTASSIUM CHLORIDE 750 MG/1
TABLET, FILM COATED, EXTENDED RELEASE ORAL DAILY
Qty: 90 TABLET | Refills: 0 | Status: SHIPPED | OUTPATIENT
Start: 2024-02-06

## 2024-03-15 ENCOUNTER — OFFICE VISIT (OUTPATIENT)
Dept: FAMILY MEDICINE CLINIC | Facility: CLINIC | Age: 49
End: 2024-03-15
Payer: COMMERCIAL

## 2024-03-15 VITALS
BODY MASS INDEX: 32.99 KG/M2 | SYSTOLIC BLOOD PRESSURE: 132 MMHG | DIASTOLIC BLOOD PRESSURE: 84 MMHG | HEART RATE: 77 BPM | WEIGHT: 198 LBS | OXYGEN SATURATION: 98 % | HEIGHT: 65 IN

## 2024-03-15 DIAGNOSIS — R53.83 MALAISE AND FATIGUE: Primary | ICD-10-CM

## 2024-03-15 DIAGNOSIS — F90.0 ADHD, PREDOMINANTLY INATTENTIVE TYPE: ICD-10-CM

## 2024-03-15 DIAGNOSIS — M35.1 MIXED CONNECTIVE TISSUE DISEASE: ICD-10-CM

## 2024-03-15 DIAGNOSIS — G47.01 INSOMNIA DUE TO MEDICAL CONDITION: ICD-10-CM

## 2024-03-15 DIAGNOSIS — R53.81 MALAISE AND FATIGUE: Primary | ICD-10-CM

## 2024-03-15 PROCEDURE — 99214 OFFICE O/P EST MOD 30 MIN: CPT | Performed by: FAMILY MEDICINE

## 2024-03-15 RX ORDER — PANTOPRAZOLE SODIUM 20 MG/1
20 TABLET, DELAYED RELEASE ORAL DAILY
Qty: 90 TABLET | Refills: 3 | Status: SHIPPED | OUTPATIENT
Start: 2024-03-15

## 2024-03-15 RX ORDER — METFORMIN HYDROCHLORIDE 500 MG/1
500 TABLET, EXTENDED RELEASE ORAL
COMMUNITY
Start: 2024-02-03

## 2024-03-15 RX ORDER — DIAZEPAM 5 MG/1
5 TABLET ORAL EVERY 12 HOURS PRN
Qty: 60 TABLET | Refills: 3 | Status: SHIPPED | OUTPATIENT
Start: 2024-03-15

## 2024-03-15 RX ORDER — METHYLPHENIDATE HYDROCHLORIDE 18 MG/1
18 TABLET ORAL EVERY MORNING
Qty: 90 TABLET | Refills: 0 | Status: SHIPPED | OUTPATIENT
Start: 2024-03-15

## 2024-03-15 NOTE — PROGRESS NOTES
Established Patient        Chief Complaint:   Chief Complaint   Patient presents with    Follow-up     4wk follow up for gerd and medication.        Jesica Green is a 49 y.o. female    History of Present Illness:   Answers submitted by the patient for this visit:  Other (Submitted on 3/15/2024)  Please describe your symptoms.: General checkup after new medication prescribed.  Have you had these symptoms before?: No  How long have you been having these symptoms?: Greater than 2 weeks  Please list any medications you are currently taking for this condition.: Stopped the medication.  Primary Reason for Visit (Submitted on 3/15/2024)  What is the primary reason for your visit?: Other      Subjective     The following portions of the patient's history were reviewed and updated as appropriate: allergies, current medications, past family history, past medical history, past social history, past surgical history and problem list.    Allergies   Allergen Reactions    Ppd [Tuberculin Purified Protein Derivative] Rash       Review of Systems  Constitutional: Negative for fever. Negative for chills, diaphoresis, fatigue and unexpected weight change.   HENT: No dysphagia; no changes to vision/hearing/smell/taste; no epistaxis  Eyes: Negative for redness and visual disturbance.   Respiratory: negative for shortness of breath. Negative for chest pain . Negative for cough and chest tightness.   Cardiovascular: Negative for chest pain and palpitations.   Gastrointestinal: Negative for abdominal distention, abdominal pain and blood in stool.   Endocrine: Negative for cold intolerance and heat intolerance.   Genitourinary: Negative for difficulty urinating, dysuria and frequency.   Musculoskeletal: Chronic arthralgias, back pain and myalgias.   Skin: Negative for color change, rash and wound.   Neurological: Negative for syncope, weakness and headaches.   Hematological: Negative for adenopathy. Does not  "bruise/bleed easily.   Psychiatric/Behavioral: Negative for confusion. The patient is not nervous/anxious.    Objective     Physical Exam   Vital Signs: /84   Pulse 77   Ht 165.1 cm (65\")   Wt 89.8 kg (198 lb)   SpO2 98%   BMI 32.95 kg/m²       Patient's (Body mass index is 32.95 kg/m².) indicates that they are obese (BMI >30) with health related conditions that include GERD and osteoarthritis . Weight is improving with lifestyle modifications. BMI is is above average; BMI management plan is completed. We discussed portion control and increasing exercise.      General Appearance: alert, oriented x 3, no acute distress.  Skin: warm and dry.   HEENT: Atraumatic.  pupils round and reactive to light and accommodation, oral mucosa pink and moist.  Nares patent without epistaxis.  External auditory canals are patent tympanic membranes intact.  Neck: supple, no JVD, trachea midline.  No thyromegaly  Lungs: CTA, unlabored breathing effort.  Heart: RRR, normal S1 and S2, no S3, no rub.  Abdomen: soft, non-tender, no palpable bladder, present bowel sounds to auscultation ×4.  No guarding or rigidity.  Extremities: no clubbing, cyanosis or edema.  Good range of motion actively and passively.  Symmetric muscle strength and development  Neuro: normal speech and mental status.  Cranial nerves II through XII intact.  No anosmia. DTR 2+; proprioception intact.  No focal motor/sensory deficits.    Advance Care Planning   ACP discussion was held with the patient during this visit. Patient does not have an advance directive, information provided.       Assessment and Plan      Assessment/Plan:   Diagnoses and all orders for this visit:    1. Malaise and fatigue (Primary)  -     methylphenidate (Concerta) 18 MG CR tablet; Take 1 tablet by mouth Every Morning  Dispense: 90 tablet; Refill: 0    2. Mixed connective tissue disease    3. ADHD, predominantly inattentive type  -     methylphenidate (Concerta) 18 MG CR tablet; Take " 1 tablet by mouth Every Morning  Dispense: 90 tablet; Refill: 0    4. Insomnia due to medical condition  -     diazePAM (VALIUM) 5 MG tablet; Take 1 tablet by mouth Every 12 (Twelve) Hours As Needed for Anxiety or Sedation.  Dispense: 60 tablet; Refill: 3    Other orders  -     pantoprazole (PROTONIX) 20 MG EC tablet; Take 1 tablet by mouth Daily.  Dispense: 90 tablet; Refill: 3    Continue as needed low-dose diazepam and treatment of insomnia.    Continue PPI therapy, as well as dietary/modifications to aid in symptom management of chronic GERD.    Utilization of methamphetamine and treatment of ADHD.    Vital signs demonstrate blood pressure well-controlled.        Discussion Summary:    Discussed plan of care in detail with pt today; pt verb understanding and agrees.    I spent 30 minutes caring for Jesica on this date of service. This time includes time spent by me in the following activities:preparing for the visit, performing a medically appropriate examination and/or evaluation , counseling and educating the patient/family/caregiver, ordering medications, tests, or procedures, documenting information in the medical record, and care coordination    I have reviewed and updated all copied forward information, as appropriate.  I attest to the accuracy and relevance of any unchanged information.    Follow up:  Return in about 2 months (around 5/14/2024) for Recheck, Med Change/New Meds.     There are no Patient Instructions on file for this visit.    Sarthak Patel DO  03/15/24  17:01 EDT

## 2024-03-29 ENCOUNTER — TRANSCRIBE ORDERS (OUTPATIENT)
Dept: ADMINISTRATIVE | Facility: HOSPITAL | Age: 49
End: 2024-03-29
Payer: COMMERCIAL

## 2024-03-29 DIAGNOSIS — Z12.31 VISIT FOR SCREENING MAMMOGRAM: Primary | ICD-10-CM

## 2024-05-06 ENCOUNTER — HOSPITAL ENCOUNTER (OUTPATIENT)
Dept: MAMMOGRAPHY | Facility: HOSPITAL | Age: 49
Discharge: HOME | End: 2024-05-06
Admitting: PHYSICIAN ASSISTANT
Payer: COMMERCIAL

## 2024-05-06 DIAGNOSIS — Z12.31 VISIT FOR SCREENING MAMMOGRAM: ICD-10-CM

## 2024-05-06 PROCEDURE — 77063 BREAST TOMOSYNTHESIS BI: CPT

## 2024-05-06 PROCEDURE — 77067 SCR MAMMO BI INCL CAD: CPT

## 2024-05-16 ENCOUNTER — OFFICE VISIT (OUTPATIENT)
Dept: FAMILY MEDICINE CLINIC | Facility: CLINIC | Age: 49
End: 2024-05-16
Payer: COMMERCIAL

## 2024-05-16 VITALS
DIASTOLIC BLOOD PRESSURE: 74 MMHG | OXYGEN SATURATION: 99 % | WEIGHT: 206 LBS | SYSTOLIC BLOOD PRESSURE: 128 MMHG | HEART RATE: 77 BPM | BODY MASS INDEX: 33.11 KG/M2 | HEIGHT: 66 IN

## 2024-05-16 DIAGNOSIS — M35.1 MIXED CONNECTIVE TISSUE DISEASE: ICD-10-CM

## 2024-05-16 DIAGNOSIS — F90.0 ADHD, PREDOMINANTLY INATTENTIVE TYPE: Primary | ICD-10-CM

## 2024-05-16 DIAGNOSIS — R53.81 MALAISE AND FATIGUE: ICD-10-CM

## 2024-05-16 DIAGNOSIS — K21.9 GASTROESOPHAGEAL REFLUX DISEASE WITHOUT ESOPHAGITIS: ICD-10-CM

## 2024-05-16 DIAGNOSIS — R53.83 MALAISE AND FATIGUE: ICD-10-CM

## 2024-05-16 PROCEDURE — 99214 OFFICE O/P EST MOD 30 MIN: CPT | Performed by: FAMILY MEDICINE

## 2024-05-16 RX ORDER — HYDROXYCHLOROQUINE SULFATE 200 MG/1
200 TABLET, FILM COATED ORAL 2 TIMES DAILY
Qty: 180 TABLET | Refills: 3 | Status: SHIPPED | OUTPATIENT
Start: 2024-05-16

## 2024-05-16 RX ORDER — PANTOPRAZOLE SODIUM 20 MG/1
20 TABLET, DELAYED RELEASE ORAL DAILY
Qty: 90 TABLET | Refills: 3 | Status: SHIPPED | OUTPATIENT
Start: 2024-05-16

## 2024-05-16 RX ORDER — METFORMIN HYDROCHLORIDE 500 MG/1
500 TABLET, EXTENDED RELEASE ORAL
Qty: 90 TABLET | Refills: 3 | Status: SHIPPED | OUTPATIENT
Start: 2024-05-16

## 2024-05-16 RX ORDER — METHYLPHENIDATE HYDROCHLORIDE 36 MG/1
36 TABLET ORAL EVERY MORNING
Qty: 90 TABLET | Refills: 0 | Status: SHIPPED | OUTPATIENT
Start: 2024-05-16

## 2024-05-16 NOTE — PROGRESS NOTES
Established Patient        Chief Complaint:   Chief Complaint   Patient presents with    Fatigue     F/U after starting Concerta        Jesica Green is a 49 y.o. female    History of Present Illness:   Answers submitted by the patient for this visit:  Other (Submitted on 5/9/2024)  Please describe your symptoms.: General follow-up  Have you had these symptoms before?: No  How long have you been having these symptoms?: Greater than 2 weeks  Primary Reason for Visit (Submitted on 5/9/2024)  What is the primary reason for your visit?: Other    Here today in follow-up of ADHD, mixed connective tissue disorder and malaise/fatigue.    She denies any problems with her current medication regimen.  She has noticed the therapeutic improvement of her ADHD symptoms since starting methylphenidate.  No ill effects, sleeping well.    She will be moving to [facility] over the course of the summer, as her  has transition to a new job in that area.  She will need to establish with a rheumatologist locally there.  She was told at her last rheumatology appointment here that they would gladly send a referral to establish, with all records, to location of her preference.  Unfortunately, they have now stated that the primary care physician needs to be the one to handle this.    Denies chest pain, syncope, palpitations or vertigo.  Denies fever, chills or night sweats.  Maintains an active lifestyle, balanced dietary intake; reports good hydration habits.  Denies hematuria/dysuria.  Denies any BRB/BTS.  No new rashes.  Denies orthopnea, epistaxis or hemoptysis.    Subjective     The following portions of the patient's history were reviewed and updated as appropriate: allergies, current medications, past family history, past medical history, past social history, past surgical history and problem list.    Allergies   Allergen Reactions    Ppd [Tuberculin Purified Protein Derivative] Rash       Review  "of Systems  Constitutional: Negative for fever. Negative for chills, diaphoresis, fatigue and unexpected weight change.   HENT: No dysphagia; no changes to vision/hearing/smell/taste; no epistaxis  Eyes: Negative for redness and visual disturbance.   Respiratory: negative for shortness of breath. Negative for chest pain . Negative for cough and chest tightness.   Cardiovascular: Negative for chest pain and palpitations.   Gastrointestinal: Negative for abdominal distention, abdominal pain and blood in stool.   Endocrine: Negative for cold intolerance and heat intolerance.   Genitourinary: Negative for difficulty urinating, dysuria and frequency.   Musculoskeletal: Chronic arthralgias, back pain and myalgias.   Skin: Negative for color change, rash and wound.   Neurological: Negative for syncope, weakness and headaches.   Hematological: Negative for adenopathy. Does not bruise/bleed easily.   Psychiatric/Behavioral: Negative for confusion. The patient is not nervous/anxious.    Objective     Physical Exam   Vital Signs: /74   Pulse 77   Ht 167.6 cm (66\")   Wt 93.4 kg (206 lb)   LMP 04/08/2024   SpO2 99%   BMI 33.25 kg/m²       Patient's (Body mass index is 33.25 kg/m².) indicates that they are obese (BMI >30) with health related conditions that include GERD and osteoarthritis . Weight is improving with lifestyle modifications. BMI is is above average; BMI management plan is completed. We discussed portion control and increasing exercise.      General Appearance: alert, oriented x 3, no acute distress.  Skin: warm and dry.   HEENT: Atraumatic.  pupils round and reactive to light and accommodation, oral mucosa pink and moist.  Nares patent without epistaxis.  External auditory canals are patent tympanic membranes intact.  Neck: supple, no JVD, trachea midline.  No thyromegaly  Lungs: CTA, unlabored breathing effort.  Heart: RRR, normal S1 and S2, no S3, no rub.  Abdomen: soft, non-tender, no palpable " bladder, present bowel sounds to auscultation ×4.  No guarding or rigidity.  Extremities: no clubbing, cyanosis or edema.  Good range of motion actively and passively.  Symmetric muscle strength and development  Neuro: normal speech and mental status.  Cranial nerves II through XII intact.  No anosmia. DTR 2+; proprioception intact.  No focal motor/sensory deficits.        Assessment and Plan      Assessment/Plan:   Diagnoses and all orders for this visit:    1. ADHD, predominantly inattentive type (Primary)  -     methylphenidate 36 MG CR tablet; Take 1 tablet by mouth Every Morning  Dispense: 90 tablet; Refill: 0    2. Mixed connective tissue disease  -     hydroxychloroquine (PLAQUENIL) 200 MG tablet; Take 1 tablet by mouth 2 (Two) Times a Day.  Dispense: 180 tablet; Refill: 3  -     Ambulatory Referral to Rheumatology  -     CBC & Differential  -     Comprehensive Metabolic Panel    3. Malaise and fatigue  -     methylphenidate 36 MG CR tablet; Take 1 tablet by mouth Every Morning  Dispense: 90 tablet; Refill: 0  -     CBC & Differential  -     Comprehensive Metabolic Panel    4. Gastroesophageal reflux disease without esophagitis  -     pantoprazole (PROTONIX) 20 MG EC tablet; Take 1 tablet by mouth Daily.  Dispense: 90 tablet; Refill: 3    Other orders  -     metFORMIN ER (GLUCOPHAGE-XR) 500 MG 24 hr tablet; Take 1 tablet by mouth Daily With Breakfast.  Dispense: 90 tablet; Refill: 3      Planned increase of methylphenidate to 36 mg dosing.  I have asked that she notify the office should she develop any ill effects to the change in dosing.    I have refilled patient's hydroxychloroquine today.  Continue scheduled eye exams, this will need to be done later in the year once relocated to [facility].    I have placed a referral to establish with a rheumatologist of her choice in the [facility] area, Dr. Costa.    Vital signs demonstrate hemodynamic stability.    Continue PPI therapy, as well as  dietary/lifestyle modifications to aid in symptom management of chronic GERD.    Surveillance CBC/CMP today.      Discussion Summary:    Discussed plan of care in detail with pt today; pt verb understanding and agrees.    I spent 30 minutes caring for Jesica on this date of service. This time includes time spent by me in the following activities:preparing for the visit, performing a medically appropriate examination and/or evaluation , counseling and educating the patient/family/caregiver, ordering medications, tests, or procedures, referring and communicating with other health care professionals , documenting information in the medical record, and care coordination    I have reviewed and updated all copied forward information, as appropriate.  I attest to the accuracy and relevance of any unchanged information.    Follow up:  No follow-ups on file.     There are no Patient Instructions on file for this visit.    Sarthak Patel DO  05/16/24  13:34 EDT

## 2024-05-17 LAB
ALBUMIN SERPL-MCNC: 4.6 G/DL (ref 3.5–5.2)
ALBUMIN/GLOB SERPL: 2.4 G/DL
ALP SERPL-CCNC: 52 U/L (ref 39–117)
ALT SERPL-CCNC: 15 U/L (ref 1–33)
AST SERPL-CCNC: 20 U/L (ref 1–32)
BASOPHILS # BLD AUTO: 0.06 10*3/MM3 (ref 0–0.2)
BASOPHILS NFR BLD AUTO: 1.2 % (ref 0–1.5)
BILIRUB SERPL-MCNC: 0.2 MG/DL (ref 0–1.2)
BUN SERPL-MCNC: 12 MG/DL (ref 6–20)
BUN/CREAT SERPL: 19.4 (ref 7–25)
CALCIUM SERPL-MCNC: 9.3 MG/DL (ref 8.6–10.5)
CHLORIDE SERPL-SCNC: 104 MMOL/L (ref 98–107)
CO2 SERPL-SCNC: 26.3 MMOL/L (ref 22–29)
CREAT SERPL-MCNC: 0.62 MG/DL (ref 0.57–1)
EGFRCR SERPLBLD CKD-EPI 2021: 110.7 ML/MIN/1.73
EOSINOPHIL # BLD AUTO: 0.08 10*3/MM3 (ref 0–0.4)
EOSINOPHIL NFR BLD AUTO: 1.6 % (ref 0.3–6.2)
ERYTHROCYTE [DISTWIDTH] IN BLOOD BY AUTOMATED COUNT: 11.8 % (ref 12.3–15.4)
GLOBULIN SER CALC-MCNC: 1.9 GM/DL
GLUCOSE SERPL-MCNC: 82 MG/DL (ref 65–99)
HCT VFR BLD AUTO: 38.5 % (ref 34–46.6)
HGB BLD-MCNC: 12.7 G/DL (ref 12–15.9)
IMM GRANULOCYTES # BLD AUTO: 0.02 10*3/MM3 (ref 0–0.05)
IMM GRANULOCYTES NFR BLD AUTO: 0.4 % (ref 0–0.5)
LYMPHOCYTES # BLD AUTO: 1.71 10*3/MM3 (ref 0.7–3.1)
LYMPHOCYTES NFR BLD AUTO: 35 % (ref 19.6–45.3)
MCH RBC QN AUTO: 30.7 PG (ref 26.6–33)
MCHC RBC AUTO-ENTMCNC: 33 G/DL (ref 31.5–35.7)
MCV RBC AUTO: 93 FL (ref 79–97)
MONOCYTES # BLD AUTO: 0.36 10*3/MM3 (ref 0.1–0.9)
MONOCYTES NFR BLD AUTO: 7.4 % (ref 5–12)
NEUTROPHILS # BLD AUTO: 2.65 10*3/MM3 (ref 1.7–7)
NEUTROPHILS NFR BLD AUTO: 54.4 % (ref 42.7–76)
NRBC BLD AUTO-RTO: 0 /100 WBC (ref 0–0.2)
PLATELET # BLD AUTO: 239 10*3/MM3 (ref 140–450)
POTASSIUM SERPL-SCNC: 4.1 MMOL/L (ref 3.5–5.2)
PROT SERPL-MCNC: 6.5 G/DL (ref 6–8.5)
RBC # BLD AUTO: 4.14 10*6/MM3 (ref 3.77–5.28)
SODIUM SERPL-SCNC: 140 MMOL/L (ref 136–145)
WBC # BLD AUTO: 4.88 10*3/MM3 (ref 3.4–10.8)

## 2024-08-15 DIAGNOSIS — M35.1 MIXED CONNECTIVE TISSUE DISEASE: ICD-10-CM

## 2024-08-15 RX ORDER — HYDROXYCHLOROQUINE SULFATE 200 MG/1
TABLET, FILM COATED ORAL DAILY
Status: CANCELLED | OUTPATIENT
Start: 2024-08-15

## 2024-08-15 RX ORDER — METFORMIN HYDROCHLORIDE 500 MG/1
500 TABLET, EXTENDED RELEASE ORAL
Qty: 90 TABLET | Refills: 3 | Status: SHIPPED | OUTPATIENT
Start: 2024-08-15

## 2024-08-15 RX ORDER — HYDROXYCHLOROQUINE SULFATE 200 MG/1
200 TABLET, FILM COATED ORAL DAILY
Qty: 60 TABLET | Refills: 1 | Status: SHIPPED | OUTPATIENT
Start: 2024-08-15

## 2024-08-15 RX ORDER — HYDROXYCHLOROQUINE SULFATE 200 MG/1
200 TABLET, FILM COATED ORAL 2 TIMES DAILY
Qty: 180 TABLET | Refills: 3 | Status: CANCELLED | OUTPATIENT
Start: 2024-08-15

## 2024-08-23 ENCOUNTER — TELEPHONE (OUTPATIENT)
Dept: FAMILY MEDICINE CLINIC | Facility: CLINIC | Age: 49
End: 2024-08-23

## 2024-08-23 RX ORDER — HYDROXYCHLOROQUINE SULFATE 200 MG/1
200 TABLET, FILM COATED ORAL 2 TIMES DAILY
Qty: 180 TABLET | Refills: 3 | Status: SHIPPED | OUTPATIENT
Start: 2024-08-23

## 2024-08-23 RX ORDER — METFORMIN HYDROCHLORIDE 500 MG/1
500 TABLET, EXTENDED RELEASE ORAL
Qty: 90 TABLET | Refills: 3 | Status: SHIPPED | OUTPATIENT
Start: 2024-08-23

## (undated) DEVICE — PATIENT RETURN ELECTRODE, SINGLE-USE, CONTACT QUALITY MONITORING, ADULT, WITH 9FT CORD, FOR PATIENTS WEIGING OVER 33LBS. (15KG): Brand: MEGADYNE

## (undated) DEVICE — FLEXIBLE YANKAUER,MEDIUM TIP, NO VACUUM CONTROL: Brand: ARGYLE

## (undated) DEVICE — SUT GUT CHRM 3/0 PS2 27IN 1638H

## (undated) DEVICE — GLV SURG BIOGEL PI ULTRATOUCH G SZ7.5 LF

## (undated) DEVICE — PENCL ES MEGADINE EZ/CLEAN BUTN W/HOLSTR 10FT

## (undated) DEVICE — PREMIUM WET SKIN PREP TRAY CHG: Brand: MEDLINE INDUSTRIES, INC.

## (undated) DEVICE — SLV SCD CALF HEMOFORCE DVT THERP REPROC MD

## (undated) DEVICE — GLV SURG SENSICARE PI LF PF 7.5 GRN STRL

## (undated) DEVICE — RICH MINOR LITHOTOMY: Brand: MEDLINE INDUSTRIES, INC.

## (undated) DEVICE — SOL SCRUB STAT ENDURE 420 CIDASTAT 2PCT FM 4OZ

## (undated) DEVICE — INTENDED FOR TISSUE SEPARATION, AND OTHER PROCEDURES THAT REQUIRE A SHARP SURGICAL BLADE TO PUNCTURE OR CUT.: Brand: BARD-PARKER ® CARBON RIB-BACK BLADES

## (undated) DEVICE — TUBING, SUCTION, 1/4" X 12', STRAIGHT: Brand: MEDLINE

## (undated) DEVICE — SOL IRR H2O BTL 1000ML STRL